# Patient Record
Sex: MALE | Race: WHITE | NOT HISPANIC OR LATINO | Employment: UNEMPLOYED | URBAN - NONMETROPOLITAN AREA
[De-identification: names, ages, dates, MRNs, and addresses within clinical notes are randomized per-mention and may not be internally consistent; named-entity substitution may affect disease eponyms.]

---

## 2020-10-18 ENCOUNTER — APPOINTMENT (OUTPATIENT)
Dept: CT IMAGING | Facility: HOSPITAL | Age: 45
End: 2020-10-18

## 2020-10-18 ENCOUNTER — APPOINTMENT (OUTPATIENT)
Dept: GENERAL RADIOLOGY | Facility: HOSPITAL | Age: 45
End: 2020-10-18

## 2020-10-18 ENCOUNTER — HOSPITAL ENCOUNTER (INPATIENT)
Facility: HOSPITAL | Age: 45
LOS: 2 days | Discharge: LEFT BEFORE DISCHARGE | End: 2020-10-20
Attending: EMERGENCY MEDICINE | Admitting: INTERNAL MEDICINE

## 2020-10-18 DIAGNOSIS — R55 SYNCOPE, CARDIOGENIC: ICD-10-CM

## 2020-10-18 DIAGNOSIS — R00.1 SYMPTOMATIC BRADYCARDIA: Primary | ICD-10-CM

## 2020-10-18 LAB
6-ACETYL MORPHINE: NEGATIVE
ALBUMIN SERPL-MCNC: 4.24 G/DL (ref 3.5–5.2)
ALBUMIN/GLOB SERPL: 1.3 G/DL
ALP SERPL-CCNC: 120 U/L (ref 39–117)
ALT SERPL W P-5'-P-CCNC: 216 U/L (ref 1–41)
AMPHET+METHAMPHET UR QL: NEGATIVE
ANION GAP SERPL CALCULATED.3IONS-SCNC: 10.8 MMOL/L (ref 5–15)
AST SERPL-CCNC: 141 U/L (ref 1–40)
BACTERIA UR QL AUTO: NORMAL /HPF
BARBITURATES UR QL SCN: NEGATIVE
BASOPHILS # BLD AUTO: 0.06 10*3/MM3 (ref 0–0.2)
BASOPHILS NFR BLD AUTO: 0.5 % (ref 0–1.5)
BENZODIAZ UR QL SCN: NEGATIVE
BILIRUB SERPL-MCNC: 0.4 MG/DL (ref 0–1.2)
BILIRUB UR QL STRIP: NEGATIVE
BUN SERPL-MCNC: 8 MG/DL (ref 6–20)
BUN/CREAT SERPL: 9.6 (ref 7–25)
BUPRENORPHINE SERPL-MCNC: POSITIVE NG/ML
CALCIUM SPEC-SCNC: 9.4 MG/DL (ref 8.6–10.5)
CANNABINOIDS SERPL QL: POSITIVE
CHLORIDE SERPL-SCNC: 102 MMOL/L (ref 98–107)
CLARITY UR: CLEAR
CO2 SERPL-SCNC: 23.2 MMOL/L (ref 22–29)
COCAINE UR QL: NEGATIVE
COLOR UR: ABNORMAL
CREAT SERPL-MCNC: 0.83 MG/DL (ref 0.76–1.27)
D DIMER PPP FEU-MCNC: 0.53 MCGFEU/ML (ref 0–0.5)
DEPRECATED RDW RBC AUTO: 44.7 FL (ref 37–54)
EOSINOPHIL # BLD AUTO: 0.25 10*3/MM3 (ref 0–0.4)
EOSINOPHIL NFR BLD AUTO: 2.2 % (ref 0.3–6.2)
ERYTHROCYTE [DISTWIDTH] IN BLOOD BY AUTOMATED COUNT: 13 % (ref 12.3–15.4)
ETHANOL BLD-MCNC: <10 MG/DL (ref 0–10)
ETHANOL UR QL: <0.01 %
GFR SERPL CREATININE-BSD FRML MDRD: 101 ML/MIN/1.73
GLOBULIN UR ELPH-MCNC: 3.4 GM/DL
GLUCOSE SERPL-MCNC: 94 MG/DL (ref 65–99)
GLUCOSE UR STRIP-MCNC: NEGATIVE MG/DL
HAV IGM SERPL QL IA: ABNORMAL
HBV CORE IGM SERPL QL IA: REACTIVE
HBV SURFACE AG SERPL QL IA: ABNORMAL
HCT VFR BLD AUTO: 47.5 % (ref 37.5–51)
HCV AB SER DONR QL: ABNORMAL
HGB BLD-MCNC: 15.8 G/DL (ref 13–17.7)
HGB UR QL STRIP.AUTO: NEGATIVE
HOLD SPECIMEN: NORMAL
HOLD SPECIMEN: NORMAL
HYALINE CASTS UR QL AUTO: NORMAL /LPF
IMM GRANULOCYTES # BLD AUTO: 0.03 10*3/MM3 (ref 0–0.05)
IMM GRANULOCYTES NFR BLD AUTO: 0.3 % (ref 0–0.5)
KETONES UR QL STRIP: NEGATIVE
LEUKOCYTE ESTERASE UR QL STRIP.AUTO: ABNORMAL
LIPASE SERPL-CCNC: 47 U/L (ref 13–60)
LYMPHOCYTES # BLD AUTO: 1.84 10*3/MM3 (ref 0.7–3.1)
LYMPHOCYTES NFR BLD AUTO: 16.3 % (ref 19.6–45.3)
MAGNESIUM SERPL-MCNC: 2 MG/DL (ref 1.6–2.6)
MCH RBC QN AUTO: 30.9 PG (ref 26.6–33)
MCHC RBC AUTO-ENTMCNC: 33.3 G/DL (ref 31.5–35.7)
MCV RBC AUTO: 92.8 FL (ref 79–97)
METHADONE UR QL SCN: NEGATIVE
MONOCYTES # BLD AUTO: 1.08 10*3/MM3 (ref 0.1–0.9)
MONOCYTES NFR BLD AUTO: 9.6 % (ref 5–12)
NEUTROPHILS NFR BLD AUTO: 71.1 % (ref 42.7–76)
NEUTROPHILS NFR BLD AUTO: 8 10*3/MM3 (ref 1.7–7)
NITRITE UR QL STRIP: NEGATIVE
NRBC BLD AUTO-RTO: 0 /100 WBC (ref 0–0.2)
OPIATES UR QL: NEGATIVE
OXYCODONE UR QL SCN: NEGATIVE
PCP UR QL SCN: NEGATIVE
PH UR STRIP.AUTO: 6 [PH] (ref 5–8)
PLATELET # BLD AUTO: 231 10*3/MM3 (ref 140–450)
PMV BLD AUTO: 10.1 FL (ref 6–12)
POTASSIUM SERPL-SCNC: 3.9 MMOL/L (ref 3.5–5.2)
PROT SERPL-MCNC: 7.6 G/DL (ref 6–8.5)
PROT UR QL STRIP: NEGATIVE
RBC # BLD AUTO: 5.12 10*6/MM3 (ref 4.14–5.8)
RBC # UR: NORMAL /HPF
REF LAB TEST METHOD: NORMAL
SARS-COV-2 RNA RESP QL NAA+PROBE: NOT DETECTED
SODIUM SERPL-SCNC: 136 MMOL/L (ref 136–145)
SP GR UR STRIP: 1.02 (ref 1–1.03)
SQUAMOUS #/AREA URNS HPF: NORMAL /HPF
T4 FREE SERPL-MCNC: 1.05 NG/DL (ref 0.93–1.7)
TROPONIN T SERPL-MCNC: <0.01 NG/ML (ref 0–0.03)
TSH SERPL DL<=0.05 MIU/L-ACNC: 6.04 UIU/ML (ref 0.27–4.2)
UROBILINOGEN UR QL STRIP: ABNORMAL
WBC # BLD AUTO: 11.26 10*3/MM3 (ref 3.4–10.8)
WBC UR QL AUTO: NORMAL /HPF
WHOLE BLOOD HOLD SPECIMEN: NORMAL
WHOLE BLOOD HOLD SPECIMEN: NORMAL

## 2020-10-18 PROCEDURE — 0 IOPAMIDOL PER 1 ML: Performed by: EMERGENCY MEDICINE

## 2020-10-18 PROCEDURE — 80307 DRUG TEST PRSMV CHEM ANLYZR: CPT | Performed by: EMERGENCY MEDICINE

## 2020-10-18 PROCEDURE — 85379 FIBRIN DEGRADATION QUANT: CPT | Performed by: EMERGENCY MEDICINE

## 2020-10-18 PROCEDURE — 84484 ASSAY OF TROPONIN QUANT: CPT | Performed by: EMERGENCY MEDICINE

## 2020-10-18 PROCEDURE — 99285 EMERGENCY DEPT VISIT HI MDM: CPT

## 2020-10-18 PROCEDURE — 99222 1ST HOSP IP/OBS MODERATE 55: CPT | Performed by: INTERNAL MEDICINE

## 2020-10-18 PROCEDURE — 93010 ELECTROCARDIOGRAM REPORT: CPT | Performed by: SPECIALIST

## 2020-10-18 PROCEDURE — 83735 ASSAY OF MAGNESIUM: CPT | Performed by: EMERGENCY MEDICINE

## 2020-10-18 PROCEDURE — 93005 ELECTROCARDIOGRAM TRACING: CPT | Performed by: STUDENT IN AN ORGANIZED HEALTH CARE EDUCATION/TRAINING PROGRAM

## 2020-10-18 PROCEDURE — 80074 ACUTE HEPATITIS PANEL: CPT | Performed by: INTERNAL MEDICINE

## 2020-10-18 PROCEDURE — 84484 ASSAY OF TROPONIN QUANT: CPT | Performed by: INTERNAL MEDICINE

## 2020-10-18 PROCEDURE — 25010000002 ENOXAPARIN PER 10 MG: Performed by: INTERNAL MEDICINE

## 2020-10-18 PROCEDURE — 84443 ASSAY THYROID STIM HORMONE: CPT | Performed by: EMERGENCY MEDICINE

## 2020-10-18 PROCEDURE — 93005 ELECTROCARDIOGRAM TRACING: CPT | Performed by: EMERGENCY MEDICINE

## 2020-10-18 PROCEDURE — 70450 CT HEAD/BRAIN W/O DYE: CPT

## 2020-10-18 PROCEDURE — 71275 CT ANGIOGRAPHY CHEST: CPT

## 2020-10-18 PROCEDURE — 71045 X-RAY EXAM CHEST 1 VIEW: CPT | Performed by: RADIOLOGY

## 2020-10-18 PROCEDURE — 93010 ELECTROCARDIOGRAM REPORT: CPT | Performed by: INTERNAL MEDICINE

## 2020-10-18 PROCEDURE — 85025 COMPLETE CBC W/AUTO DIFF WBC: CPT | Performed by: EMERGENCY MEDICINE

## 2020-10-18 PROCEDURE — 87635 SARS-COV-2 COVID-19 AMP PRB: CPT | Performed by: EMERGENCY MEDICINE

## 2020-10-18 PROCEDURE — 81001 URINALYSIS AUTO W/SCOPE: CPT | Performed by: EMERGENCY MEDICINE

## 2020-10-18 PROCEDURE — 36415 COLL VENOUS BLD VENIPUNCTURE: CPT

## 2020-10-18 PROCEDURE — 71045 X-RAY EXAM CHEST 1 VIEW: CPT

## 2020-10-18 PROCEDURE — 87341 HEP B SURFACE AG NEUTRLZJ IA: CPT | Performed by: EMERGENCY MEDICINE

## 2020-10-18 PROCEDURE — 80053 COMPREHEN METABOLIC PANEL: CPT | Performed by: EMERGENCY MEDICINE

## 2020-10-18 PROCEDURE — 84439 ASSAY OF FREE THYROXINE: CPT | Performed by: EMERGENCY MEDICINE

## 2020-10-18 PROCEDURE — 83690 ASSAY OF LIPASE: CPT | Performed by: EMERGENCY MEDICINE

## 2020-10-18 RX ORDER — BUPRENORPHINE HYDROCHLORIDE AND NALOXONE HYDROCHLORIDE DIHYDRATE 8; 2 MG/1; MG/1
1 TABLET SUBLINGUAL DAILY
Status: DISCONTINUED | OUTPATIENT
Start: 2020-10-19 | End: 2020-10-19

## 2020-10-18 RX ORDER — SODIUM CHLORIDE 9 MG/ML
125 INJECTION, SOLUTION INTRAVENOUS CONTINUOUS
Status: DISCONTINUED | OUTPATIENT
Start: 2020-10-18 | End: 2020-10-19

## 2020-10-18 RX ORDER — SODIUM CHLORIDE 0.9 % (FLUSH) 0.9 %
10 SYRINGE (ML) INJECTION AS NEEDED
Status: DISCONTINUED | OUTPATIENT
Start: 2020-10-18 | End: 2020-10-21 | Stop reason: HOSPADM

## 2020-10-18 RX ORDER — SODIUM CHLORIDE 9 MG/ML
100 INJECTION, SOLUTION INTRAVENOUS CONTINUOUS
Status: DISCONTINUED | OUTPATIENT
Start: 2020-10-18 | End: 2020-10-18

## 2020-10-18 RX ORDER — BUPRENORPHINE HYDROCHLORIDE AND NALOXONE HYDROCHLORIDE DIHYDRATE 8; 2 MG/1; MG/1
1 TABLET SUBLINGUAL DAILY
Status: DISCONTINUED | OUTPATIENT
Start: 2020-10-19 | End: 2020-10-18

## 2020-10-18 RX ORDER — BUPRENORPHINE HYDROCHLORIDE AND NALOXONE HYDROCHLORIDE DIHYDRATE 8; 2 MG/1; MG/1
1 TABLET SUBLINGUAL ONCE
Status: COMPLETED | OUTPATIENT
Start: 2020-10-18 | End: 2020-10-18

## 2020-10-18 RX ORDER — BUPRENORPHINE HYDROCHLORIDE AND NALOXONE HYDROCHLORIDE DIHYDRATE 8; 2 MG/1; MG/1
1 TABLET SUBLINGUAL DAILY
COMMUNITY

## 2020-10-18 RX ORDER — SODIUM CHLORIDE 0.9 % (FLUSH) 0.9 %
10 SYRINGE (ML) INJECTION EVERY 12 HOURS SCHEDULED
Status: DISCONTINUED | OUTPATIENT
Start: 2020-10-18 | End: 2020-10-21 | Stop reason: HOSPADM

## 2020-10-18 RX ADMIN — SODIUM CHLORIDE 1000 ML: 9 INJECTION, SOLUTION INTRAVENOUS at 14:15

## 2020-10-18 RX ADMIN — ENOXAPARIN SODIUM 40 MG: 40 INJECTION SUBCUTANEOUS at 22:35

## 2020-10-18 RX ADMIN — SODIUM CHLORIDE, PRESERVATIVE FREE 10 ML: 5 INJECTION INTRAVENOUS at 20:15

## 2020-10-18 RX ADMIN — BUPRENORPHINE HYDROCHLORIDE AND NALOXONE HYDROCHLORIDE DIHYDRATE 1 TABLET: 8; 2 TABLET SUBLINGUAL at 22:36

## 2020-10-18 RX ADMIN — IOPAMIDOL 84 ML: 755 INJECTION, SOLUTION INTRAVENOUS at 16:53

## 2020-10-18 RX ADMIN — SODIUM CHLORIDE 125 ML/HR: 9 INJECTION, SOLUTION INTRAVENOUS at 15:04
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
## 2020-10-18 NOTE — ED NOTES
Pt placed on 2lpm nc per verbal order from dr olivarez. [email or web address].     April Betancourt, RN  10/18/20 2762

## 2020-10-18 NOTE — ED NOTES
Gave report to rubin cota on ccu. Nurse received report, reports room has not been cleaned yet and she will call us back when it is ready.      April Betancourt RN  10/18/20 2470

## 2020-10-18 NOTE — ED NOTES
Pt diaphoretic, complains of sob, hr 45. Dr olivarez made aware, will await new orders.     April Betancourt, RN  10/18/20 4984
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
## 2020-10-18 NOTE — ED NOTES
Obtained and verified informed consent for ct with iv contrast per pe protocol. Pt signed form.     April Betancourt RN  10/18/20 8388

## 2020-10-18 NOTE — ED NOTES
Dr renteria at bedside, pt became diaphoretic, noted pt rhythm to be afib and then asystole, pt loss consciousness. Dr olivarez to bedside.       April Betancourt, MARS  10/18/20 1802       April Betancourt, MARS  10/18/20 1804

## 2020-10-18 NOTE — ED NOTES
Pt alert and oriented, skin pink, warm, and diaphoretic, respirations even and unlabored, pt denies pain. Pt is not diaphoretic and reports he feels a little better. Pt remains on 2lpm nc. Sinus arrhythmia noted. Hr 86. Family at bedside. Pt remains on defib pads and zoles monitoring. Crash cart remains at bedside.     April Betancourt, MARS  10/18/20 1819       April Betancourt, MARS  10/18/20 1912

## 2020-10-18 NOTE — ED NOTES
Pt now noted to be in afib, provider made aware, pt resting on stretcher, pt is alert and oriented, skin pale, warm, and diaphoretic. respirations even and unlabored. Pt remains on 2lpm nc. Wctm. Family at bedside.     April Betancourt, RN  10/18/20 1835       April Betancourt, MARS  10/18/20 1912
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
## 2020-10-18 NOTE — PROGRESS NOTES
Discharge Planning Assessment   [place]     Patient Name: Neo Chung  MRN: [id number]  Today's Date: 10/18/2020    Admit Date: 10/18/2020    Discharge Needs Assessment     Row Name 10/18/20 1820       Living Environment    Lives With  significant other    Name(s) of Who Lives With Patient  lives with significant other Jenn Sparks    Current Living Arrangements  home/apartment/condo    Primary Care Provided by  self    Quality of Family Relationships  helpful;involved;supportive    Able to Return to Prior Arrangements  yes       Resource/Environmental Concerns    Resource/Environmental Concerns  none       Transition Planning    Patient/Family Anticipates Transition to  home with family    Patient/Family Anticipated Services at Transition  none    Transportation Anticipated  car, drives self;family or friend will provide       Discharge Needs Assessment    Readmission Within the Last 30 Days  no previous admission in last 30 days    Equipment Currently Used at Home  none    Concerns to be Addressed  no discharge needs identified;denies needs/concerns at this time    Anticipated Changes Related to Illness  none    Equipment Needed After Discharge  none        Discharge Plan     Row Name 10/18/20 1821       Plan    Plan  Pt lives with significant other Jenn Sparks who is at bedside and plans to return home upon discharge, pt states he provides his own transportation. Pt does not have a pcp, he uses [facility] in [place] and has [employer] insurance. Pt denies any issues with meds or copays. Pt does not have a poa or advanced directives. Pt is independent with adl's and does not use any dme's, home health or home o2. No needs notifed at this time.    Patient/Family in Agreement with Plan  yes        Continued Care and Services - Admitted Since 10/18/2020    Coordination has not been started for this encounter.         Demographic Summary     Row Name 10/18/20 1820       General Information     Admission Type  inpatient    Arrived From  home    Referral Source  emergency department    Reason for Consult  discharge planning    Preferred Language  English        Functional Status     Row Name 10/18/20 1820       Functional Status    Usual Activity Tolerance  good    Current Activity Tolerance  fair       Mental Status    General Appearance WDL  WDL        Psychosocial     Row Name 10/18/20 1820       Behavior WDL    Behavior WDL  WDL       Emotion Mood WDL    Emotion/Mood/Affect WDL  WDL       Speech WDL    Speech WDL  WDL        Abuse/Neglect    No documentation.       Legal    No documentation.       Substance Abuse    No documentation.       Patient Forms    No documentation.           Apple Acevedo RN

## 2020-10-18 NOTE — ED NOTES
Unit secretary reports nurse is unavailable to take report at this time.     April Betancourt, RN  10/18/20 3661

## 2020-10-18 NOTE — ED NOTES
pt converted to sinus arrhythmia, dr olivarez to bedside. defib pads and zoles monitor placed on pt. Crash cart at bedside, dr olivarez now at bedside.     April Betancourt RN  10/18/20 1479

## 2020-10-19 ENCOUNTER — APPOINTMENT (OUTPATIENT)
Dept: CARDIOLOGY | Facility: HOSPITAL | Age: 45
End: 2020-10-19

## 2020-10-19 LAB
ALBUMIN SERPL-MCNC: 3.59 G/DL (ref 3.5–5.2)
ALBUMIN/GLOB SERPL: 1.2 G/DL
ALP SERPL-CCNC: 100 U/L (ref 39–117)
ALT SERPL W P-5'-P-CCNC: 185 U/L (ref 1–41)
ANION GAP SERPL CALCULATED.3IONS-SCNC: 8.5 MMOL/L (ref 5–15)
AST SERPL-CCNC: 112 U/L (ref 1–40)
BASOPHILS # BLD AUTO: 0.03 10*3/MM3 (ref 0–0.2)
BASOPHILS NFR BLD AUTO: 0.3 % (ref 0–1.5)
BH CV ECHO MEAS - % IVS THICK: -4 %
BH CV ECHO MEAS - % LVPW THICK: 7 %
BH CV ECHO MEAS - ACS: 1.8 CM
BH CV ECHO MEAS - AO MAX PG: 11.4 MMHG
BH CV ECHO MEAS - AO MEAN PG: 4 MMHG
BH CV ECHO MEAS - AO ROOT AREA (BSA CORRECTED): 1.9
BH CV ECHO MEAS - AO ROOT AREA: 9.6 CM^2
BH CV ECHO MEAS - AO ROOT DIAM: 3.5 CM
BH CV ECHO MEAS - AO V2 MAX: 169 CM/SEC
BH CV ECHO MEAS - AO V2 MEAN: 92.4 CM/SEC
BH CV ECHO MEAS - AO V2 VTI: 30.4 CM
BH CV ECHO MEAS - BSA(HAYCOCK): 1.9 M^2
BH CV ECHO MEAS - BSA: 1.9 M^2
BH CV ECHO MEAS - BZI_BMI: 23.3 KILOGRAMS/M^2
BH CV ECHO MEAS - BZI_METRIC_HEIGHT: 175.3 CM
BH CV ECHO MEAS - BZI_METRIC_WEIGHT: 71.7 KG
BH CV ECHO MEAS - EDV(CUBED): 111.6 ML
BH CV ECHO MEAS - EDV(MOD-SP4): 86.7 ML
BH CV ECHO MEAS - EDV(TEICH): 108.3 ML
BH CV ECHO MEAS - EF(CUBED): 75.7 %
BH CV ECHO MEAS - EF(MOD-SP4): 66.9 %
BH CV ECHO MEAS - EF(TEICH): 67.6 %
BH CV ECHO MEAS - ESV(CUBED): 27.1 ML
BH CV ECHO MEAS - ESV(MOD-SP4): 28.7 ML
BH CV ECHO MEAS - ESV(TEICH): 35.1 ML
BH CV ECHO MEAS - FS: 37.6 %
BH CV ECHO MEAS - IVS/LVPW: 1
BH CV ECHO MEAS - IVSD: 0.97 CM
BH CV ECHO MEAS - IVSS: 0.93 CM
BH CV ECHO MEAS - LA DIMENSION: 2.8 CM
BH CV ECHO MEAS - LA/AO: 0.79
BH CV ECHO MEAS - LV DIASTOLIC VOL/BSA (35-75): 46.4 ML/M^2
BH CV ECHO MEAS - LV MASS(C)D: 160.2 GRAMS
BH CV ECHO MEAS - LV MASS(C)DI: 85.7 GRAMS/M^2
BH CV ECHO MEAS - LV MASS(C)S: 78.1 GRAMS
BH CV ECHO MEAS - LV MASS(C)SI: 41.8 GRAMS/M^2
BH CV ECHO MEAS - LV SYSTOLIC VOL/BSA (12-30): 15.4 ML/M^2
BH CV ECHO MEAS - LVIDD: 4.8 CM
BH CV ECHO MEAS - LVIDS: 3 CM
BH CV ECHO MEAS - LVLD AP4: 7.7 CM
BH CV ECHO MEAS - LVLS AP4: 5.8 CM
BH CV ECHO MEAS - LVOT AREA (M): 3.5 CM^2
BH CV ECHO MEAS - LVOT AREA: 3.5 CM^2
BH CV ECHO MEAS - LVOT DIAM: 2.1 CM
BH CV ECHO MEAS - LVPWD: 0.94 CM
BH CV ECHO MEAS - LVPWS: 1 CM
BH CV ECHO MEAS - MV A MAX VEL: 102 CM/SEC
BH CV ECHO MEAS - MV E MAX VEL: 115 CM/SEC
BH CV ECHO MEAS - MV E/A: 1.1
BH CV ECHO MEAS - PA ACC TIME: 0.1 SEC
BH CV ECHO MEAS - PA PR(ACCEL): 33.1 MMHG
BH CV ECHO MEAS - SI(AO): 156.5 ML/M^2
BH CV ECHO MEAS - SI(CUBED): 45.2 ML/M^2
BH CV ECHO MEAS - SI(MOD-SP4): 31 ML/M^2
BH CV ECHO MEAS - SI(TEICH): 39.2 ML/M^2
BH CV ECHO MEAS - SV(AO): 292.5 ML
BH CV ECHO MEAS - SV(CUBED): 84.5 ML
BH CV ECHO MEAS - SV(MOD-SP4): 58 ML
BH CV ECHO MEAS - SV(TEICH): 73.2 ML
BILIRUB SERPL-MCNC: 0.4 MG/DL (ref 0–1.2)
BUN SERPL-MCNC: 7 MG/DL (ref 6–20)
BUN/CREAT SERPL: 10.6 (ref 7–25)
CALCIUM SPEC-SCNC: 8.8 MG/DL (ref 8.6–10.5)
CHLORIDE SERPL-SCNC: 107 MMOL/L (ref 98–107)
CO2 SERPL-SCNC: 23.5 MMOL/L (ref 22–29)
CREAT SERPL-MCNC: 0.66 MG/DL (ref 0.76–1.27)
DEPRECATED RDW RBC AUTO: 44.7 FL (ref 37–54)
EOSINOPHIL # BLD AUTO: 0.11 10*3/MM3 (ref 0–0.4)
EOSINOPHIL NFR BLD AUTO: 1.1 % (ref 0.3–6.2)
ERYTHROCYTE [DISTWIDTH] IN BLOOD BY AUTOMATED COUNT: 13.1 % (ref 12.3–15.4)
GFR SERPL CREATININE-BSD FRML MDRD: 132 ML/MIN/1.73
GLOBULIN UR ELPH-MCNC: 2.9 GM/DL
GLUCOSE SERPL-MCNC: 107 MG/DL (ref 65–99)
HCT VFR BLD AUTO: 42.9 % (ref 37.5–51)
HGB BLD-MCNC: 14.4 G/DL (ref 13–17.7)
IMM GRANULOCYTES # BLD AUTO: 0.03 10*3/MM3 (ref 0–0.05)
IMM GRANULOCYTES NFR BLD AUTO: 0.3 % (ref 0–0.5)
LYMPHOCYTES # BLD AUTO: 1.79 10*3/MM3 (ref 0.7–3.1)
LYMPHOCYTES NFR BLD AUTO: 17.4 % (ref 19.6–45.3)
MAGNESIUM SERPL-MCNC: 2 MG/DL (ref 1.6–2.6)
MAXIMAL PREDICTED HEART RATE: 177 BPM
MCH RBC QN AUTO: 31.4 PG (ref 26.6–33)
MCHC RBC AUTO-ENTMCNC: 33.6 G/DL (ref 31.5–35.7)
MCV RBC AUTO: 93.5 FL (ref 79–97)
MONOCYTES # BLD AUTO: 0.99 10*3/MM3 (ref 0.1–0.9)
MONOCYTES NFR BLD AUTO: 9.6 % (ref 5–12)
NEUTROPHILS NFR BLD AUTO: 7.31 10*3/MM3 (ref 1.7–7)
NEUTROPHILS NFR BLD AUTO: 71.3 % (ref 42.7–76)
NRBC BLD AUTO-RTO: 0 /100 WBC (ref 0–0.2)
PLATELET # BLD AUTO: 209 10*3/MM3 (ref 140–450)
PMV BLD AUTO: 9.4 FL (ref 6–12)
POTASSIUM SERPL-SCNC: 4.2 MMOL/L (ref 3.5–5.2)
PROT SERPL-MCNC: 6.5 G/DL (ref 6–8.5)
RBC # BLD AUTO: 4.59 10*6/MM3 (ref 4.14–5.8)
SODIUM SERPL-SCNC: 139 MMOL/L (ref 136–145)
STRESS TARGET HR: 150 BPM
T4 FREE SERPL-MCNC: 0.94 NG/DL (ref 0.93–1.7)
TROPONIN T SERPL-MCNC: <0.01 NG/ML (ref 0–0.03)
TROPONIN T SERPL-MCNC: <0.01 NG/ML (ref 0–0.03)
TSH SERPL DL<=0.05 MIU/L-ACNC: 3.1 UIU/ML (ref 0.27–4.2)
WBC # BLD AUTO: 10.26 10*3/MM3 (ref 3.4–10.8)

## 2020-10-19 PROCEDURE — 80053 COMPREHEN METABOLIC PANEL: CPT | Performed by: INTERNAL MEDICINE

## 2020-10-19 PROCEDURE — 25010000002 ENOXAPARIN PER 10 MG: Performed by: INTERNAL MEDICINE

## 2020-10-19 PROCEDURE — 85025 COMPLETE CBC W/AUTO DIFF WBC: CPT | Performed by: INTERNAL MEDICINE

## 2020-10-19 PROCEDURE — 84484 ASSAY OF TROPONIN QUANT: CPT | Performed by: INTERNAL MEDICINE

## 2020-10-19 PROCEDURE — 84439 ASSAY OF FREE THYROXINE: CPT | Performed by: INTERNAL MEDICINE

## 2020-10-19 PROCEDURE — 99222 1ST HOSP IP/OBS MODERATE 55: CPT | Performed by: SPECIALIST

## 2020-10-19 PROCEDURE — 93306 TTE W/DOPPLER COMPLETE: CPT | Performed by: INTERNAL MEDICINE

## 2020-10-19 PROCEDURE — 99233 SBSQ HOSP IP/OBS HIGH 50: CPT | Performed by: INTERNAL MEDICINE

## 2020-10-19 PROCEDURE — 94799 UNLISTED PULMONARY SVC/PX: CPT

## 2020-10-19 PROCEDURE — 84443 ASSAY THYROID STIM HORMONE: CPT | Performed by: INTERNAL MEDICINE

## 2020-10-19 PROCEDURE — 93306 TTE W/DOPPLER COMPLETE: CPT

## 2020-10-19 PROCEDURE — 83735 ASSAY OF MAGNESIUM: CPT | Performed by: INTERNAL MEDICINE

## 2020-10-19 RX ORDER — BUPRENORPHINE HYDROCHLORIDE AND NALOXONE HYDROCHLORIDE DIHYDRATE 8; 2 MG/1; MG/1
0.5 TABLET SUBLINGUAL 2 TIMES DAILY
Status: DISCONTINUED | OUTPATIENT
Start: 2020-10-19 | End: 2020-10-21 | Stop reason: HOSPADM

## 2020-10-19 RX ADMIN — BUPRENORPHINE HYDROCHLORIDE AND NALOXONE HYDROCHLORIDE DIHYDRATE 0.5 TABLET: 8; 2 TABLET SUBLINGUAL at 08:34

## 2020-10-19 RX ADMIN — SODIUM CHLORIDE, PRESERVATIVE FREE 10 ML: 5 INJECTION INTRAVENOUS at 20:31

## 2020-10-19 RX ADMIN — BUPRENORPHINE HYDROCHLORIDE AND NALOXONE HYDROCHLORIDE DIHYDRATE 0.5 TABLET: 8; 2 TABLET SUBLINGUAL at 15:17

## 2020-10-19 RX ADMIN — SODIUM CHLORIDE, PRESERVATIVE FREE 10 ML: 5 INJECTION INTRAVENOUS at 08:35

## 2020-10-19 RX ADMIN — ENOXAPARIN SODIUM 40 MG: 40 INJECTION SUBCUTANEOUS at 21:44

## 2020-10-19 RX ADMIN — SODIUM CHLORIDE 125 ML/HR: 9 INJECTION, SOLUTION INTRAVENOUS at 04:13

## 2020-10-19 NOTE — PAYOR COMM NOTE
"  [facility]  NPI: [id number]    Utilization Review   Contact:Sasha Negron MSN, APRN, NP-C  Phone: [phone]  Fax: [phone]    humana mcaid/Attn: nurse review  Inpatient Auth Req  REF: [id number]    Neo Chung (44 y.o. Male)     Date of Birth Social Security Number Address Home Phone MRN    1975  [address] [facility] [address] [phone] [id number]    [employer] Marital Status          None Single       Admission Date Admission Type Admitting Provider Attending Provider Department, Room/Bed    10/18/20 Emergency Augustine Uriostegui MD Hacker, Bill J, MD [facility] CRITICAL CARE, 05/1C    Discharge Date Discharge Disposition Discharge Destination                       Attending Provider: Alfredo Rollins MD    Allergies: No Known Allergies    Isolation: None   Infection: None   Code Status: CPR    Ht: 175.3 cm (69\")   Wt: 72 kg (158 lb 11.7 oz)    Admission Cmt: None   Principal Problem: None                Active Insurance as of 10/18/2020     Primary Coverage     Payor Plan Insurance Group Employer/Plan Group    [place] [id number]     Payor Plan Address Payor Plan Phone Number Payor Plan Fax Number Effective Dates    [employer] Claims Office - [address] [address] [phone]  4/1/2020 - None Entered    [facility] [id number]       Subscriber Name Subscriber Birth Date Member ID       NEO CHUNG 1975 [id number]                 Emergency Contacts      (Rel.) Home Phone Work Phone Mobile Phone    BARNEY HICKEY (Significant Other) [phone] -- --               History & Physical      Augustine Uriostegui MD at 10/18/20 1830                      Date of admission: 10/18/2020     Assisted by: Patient's significant other, permission to talk in front of her obtained from the patient, also later multiple staff members when the patient became severely bradycardic     Chief complaint: Passed out     History of present illness: Patient " "is a 44-year-old male who really he states is never seen a doctor with exception of at the Suboxone clinic who was in his usual state of health until the first time this happened today he had an episode where he felt hot and then became sweaty, he was driving a car but only going 2 to 3 miles an hour, his significant other was able to get it up into neutral and then parked, she states he passed out but was only out for about 15 minutes.  He was alert and oriented when he came to.  He had another episode when he walked in from the parking lot to our emergency room of similar nature, he became diaphoretic and then passed out.  He had another episode in the emergency room and then when I was actually in the room patient felt like it was happening again, his face turned red, he got very diaphoretic and then had a syncopal episode, monitor correlated at that time with a long pause that appeared to be asystole for a brief time.  I had gone out briefly to asked them to get another EKG as he looked like he was having an abnormal rhythm such as atrial fibrillation.  When he woke up, he was alert and oriented, there were no seizure movements noted, he denied any chest pain, he states he has been more short winded today.  PE protocol done out of the emergency room showed no PE.  Patient does use regular marijuana states he last smoked this morning but he has done this since he was a teenager.  He also takes regular Suboxone twice daily but he is not change the dosing of this recently.  He states at home he occasionally will see \"some spots that are very brief\" and occasionally feel little \"lightheaded\" but overall no complaints he is active without chest pain.     Past medical history:  Chronic pain  Otherwise no known past medical history     No known medical allergies     Social history: Positive for smoking, positive marijuana, no other illicit drugs, he used to drink alcohol but has not done this in a long time.     Family " history: No premature coronary disease, father  of alcoholism, he states his mother is overall fairly healthy.     ROS: Negative except chronic pain and as per HPI.     Exam: When I initially saw him he was alert, skin warm and dry, answers questions appropriately, during this event he was diaphoretic flushed and unresponsive for a brief time.  When he did wake up he was oriented.  Hearing is intact conjunctiva unremarkable speech is normal face symmetric pupils equal no carotid bruits are heard neck is supple lungs have bilateral breath sounds are clear no rhonchi rales or wheezing heard, heart is an irregularly irregular rhythm without murmur rub or gallop, abdomen is soft benign bowel sounds active nondistended nontender, extremities with edema adequate distal pulses strength is symmetric neurologically nonfocal, mood is good        Data:  EKG obtained during the episode I witnessed by the time the EKG was hooked up it showed atrial fibrillation with a rate in the 70s image reviewed, the first 2 EKGs I have also reviewed and showed just sinus rhythm.     CT PE protocol showed small noncalcified nodules right upper lobe largest 5 mm, would recommend follow-up in 12 months.  He also was noted to have mild coronary vascular calcification     CT head negative     Chest x-ray negative     Laboratory data: UA small amount of leukocyte Estrace otherwise negative, toxicology screen was positive for what he stated he took which was Suboxone and THC, microscopic of the urine unremarkable, transaminases elevated otherwise chemistries unremarkable, magnesium normal lipase 47 D-dimer minimally elevated 0.53, troponin negative, TSH mildly elevated at 6 free T4 was normal ethanol level was negative white count 11 hemoglobin 15 hematocrit 47, platelet count 231     Assessment and plan:  Symptomatic bradycardia with associated syncope, this was witnessed on the monitor, he had a very long pause that almost appeared to be  asystole for a brief time.  This was brief and he came back with an adequate rate but was atrial fibrillation.  The exact etiology of this bradycardia is uncertain, both THC and Suboxone certainly have been known to cause bradycardia but he did not take any new dose.  His TSH is 6 which I am going to repeat but this would not be enough to cause this as well.  Electrolytes are unremarkable.  External pacemaker has been placed, this is to be turned on if he gets a heart rate below 50, he is being admitted to the CCU, briefly discussed this with cardiology, if the patient become symptomatic again would consider dopamine infusion.  Echocardiogram will be checked, will track serial troponins.  Repeat thyroid in the morning.  Patient because his significant other could not stay with him stated initially he was going to leave.  I did explain that he would most like die if he left, he has agreed to stay.  His [id number] score is 0 so I have not fully anticoagulated him considering he also had a negative PE protocol.     Elevated transaminases, possibly underlying hepatitis C, asymptomatic, I have ordered hepatitis profile.     DVT prophylaxis, subcu Lovenox     Small 5 mm largest pulmonary nodule, would recommend repeat CT in approximately 1 year.               Electronically signed by Augustine Uriostegui MD at 10/18/20 1831          Emergency Department Notes      April Betancourt RN at 10/18/20 1545        Obtained and verified informed consent for ct with iv contrast per pe protocol. Pt signed form.     April Betancourt RN  10/18/20 1615      Electronically signed by April Betancourt RN at 10/18/20 1615     April Betancourt RN at 10/18/20 1708        Pt diaphoretic, complains of sob, hr 45. Dr olivarez made aware, will await new orders.     April Betancourt RN  10/18/20 1709      Electronically signed by April Betancourt RN at 10/18/20 1709     April Betancourt RN at 10/18/20  1714        Pt placed on 2lpm nc per verbal order from dr olivarez. [email or web address].     April Betancourt, RN  10/18/20 1714      Electronically signed by April Betancourt RN at 10/18/20 1714     Kerry Hicks P at 10/18/20 1735        Paged hospitalist for Kerry Sarmiento P  10/18/20 1735      Electronically signed by Kerry Hicks at 10/18/20 1735     Kerry Hicks P at 10/18/20 1742        Dr olivarez on the phone with Dr Renteria at this time     Kerry Hicks P  10/18/20 1742      Electronically signed by Kerry Hicks at 10/18/20 1742     April Betancourt, MARS at 10/18/20 1756        Dr renteria at bedside, pt became diaphoretic, noted pt rhythm to be afib and then asystole, pt loss consciousness. Dr olivarez to bedside.       April Betancourt RN  10/18/20 1802       April Betancourt RN  10/18/20 1804      Electronically signed by April Betancourt RN at 10/18/20 1804     April Betancourt, RN at 10/18/20 1757        pt converted to sinus arrhythmia, dr olivarez to bedside. defib pads and zoles monitor placed on pt. Crash cart at bedside, dr olivarez now at bedside.     April Betancourt RN  10/18/20 1804      Electronically signed by April Betancourt RN at 10/18/20 1804     April Betancourt RN at 10/18/20 1819        Pt alert and oriented, skin pink, warm, and diaphoretic, respirations even and unlabored, pt denies pain. Pt is not diaphoretic and reports he feels a little better. Pt remains on 2lpm nc. Sinus arrhythmia noted. Hr 86. Family at bedside. Pt remains on defib pads and zoles monitoring. Crash cart remains at bedside.     April Betancourt RN  10/18/20 1819       April Betancourt RN  10/18/20 1912      Electronically signed by April Betancourt RN at 10/18/20 1912     April Betancourt RN at 10/18/20 1835        Pt now noted to be in afib, provider made aware, pt resting on stretcher, pt is alert and  oriented, skin pale, warm, and diaphoretic. respirations even and unlabored. Pt remains on 2lpm nc. Wctm. Family at bedside.     April Betancourt RN  10/18/20 1835       April Betancourt RN  10/18/20 1912      Electronically signed by April Betancourt RN at 10/18/20 1912     April Betancourt RN at 10/18/20 1853        Unit secretary reports nurse is unavailable to take report at this time.     April Betancourt RN  10/18/20 1854      Electronically signed by April Betancourt RN at 10/18/20 1854     April Betancourt RN at 10/18/20 1900        Gave report to rubin cota on ccu. Nurse received report, reports room has not been cleaned yet and she will call us back when it is ready.      April Betancourt RN  10/18/20 1901      Electronically signed by April Betancourt RN at 10/18/20 1901         Lab Results (last 72 hours)     Procedure Component Value Units Date/Time    Troponin [[id number]]  (Normal) Collected: 10/19/20 0209    Specimen: Blood Updated: 10/19/20 0255     Troponin T <0.010 ng/mL     Narrative:      Troponin T Reference Range:  <= 0.03 ng/mL-   Negative for AMI  >0.03 ng/mL-     Abnormal for myocardial necrosis.  Clinicians would have to utilize clinical acumen, EKG, Troponin and serial changes to determine if it is an Acute Myocardial Infarction or myocardial injury due to an underlying chronic condition.       Results may be falsely decreased if patient taking Biotin.      TSH [[id number]]  (Normal) Collected: 10/19/20 0209    Specimen: Blood Updated: 10/19/20 0255     TSH 3.100 uIU/mL     T4, Free [[id number]]  (Normal) Collected: 10/19/20 0209    Specimen: Blood Updated: 10/19/20 0255     Free T4 0.94 ng/dL     Narrative:      Results may be falsely increased if patient taking Biotin.      Comprehensive Metabolic Panel [[id number]]  (Abnormal) Collected: 10/19/20 0209    Specimen: Blood Updated: 10/19/20 0251     Glucose 107 mg/dL      BUN 7  mg/dL      Creatinine 0.66 mg/dL      Sodium 139 mmol/L      Potassium 4.2 mmol/L      Chloride 107 mmol/L      CO2 23.5 mmol/L      Calcium 8.8 mg/dL      Total Protein 6.5 g/dL      Albumin 3.59 g/dL      ALT (SGPT) 185 U/L      AST (SGOT) 112 U/L      Alkaline Phosphatase 100 U/L      Total Bilirubin 0.4 mg/dL      eGFR Non African Amer 132 mL/min/1.73      Globulin 2.9 gm/dL      A/G Ratio 1.2 g/dL      BUN/Creatinine Ratio 10.6     Anion Gap 8.5 mmol/L     Narrative:      GFR Normal >60  Chronic Kidney Disease <60  Kidney Failure <15      Magnesium [[id number]]  (Normal) Collected: 10/19/20 0209    Specimen: Blood Updated: 10/19/20 0251     Magnesium 2.0 mg/dL     CBC & Differential [[id number]]  (Abnormal) Collected: 10/19/20 0209    Specimen: Blood Updated: 10/19/20 0220    Narrative:      The following orders were created for panel order CBC & Differential.  Procedure                               Abnormality         Status                     ---------                               -----------         ------                     CBC Auto Differential[[id number]]        Abnormal            Final result                 Please view results for these tests on the individual orders.    CBC Auto Differential [[id number]]  (Abnormal) Collected: 10/19/20 0209    Specimen: Blood Updated: 10/19/20 0220     WBC 10.26 10*3/mm3      RBC 4.59 10*6/mm3      Hemoglobin 14.4 g/dL      Hematocrit 42.9 %      MCV 93.5 fL      MCH 31.4 pg      MCHC 33.6 g/dL      RDW 13.1 %      RDW-SD 44.7 fl      MPV 9.4 fL      Platelets 209 10*3/mm3      Neutrophil % 71.3 %      Lymphocyte % 17.4 %      Monocyte % 9.6 %      Eosinophil % 1.1 %      Basophil % 0.3 %      Immature Grans % 0.3 %      Neutrophils, Absolute 7.31 10*3/mm3      Lymphocytes, Absolute 1.79 10*3/mm3      Monocytes, Absolute 0.99 10*3/mm3      Eosinophils, Absolute 0.11 10*3/mm3      Basophils, Absolute 0.03 10*3/mm3      Immature Grans, Absolute 0.03 10*3/mm3      nRBC  0.0 /100 WBC     Hepatitis Panel, Acute [[id number]]  (Abnormal) Collected: 10/18/20 1411    Specimen: Blood Updated: 10/18/20 2116     Hepatitis B Surface Ag --     Hep A IgM Non-Reactive     Hep B C IgM Reactive     Hepatitis C Ab Non-Reactive    Narrative:      Results may be falsely decreased if patient taking Biotin.   Preliminary reactive result pending confirmation at [facility].     Hepatitis B Surface Antigen [[id number]] Collected: 10/18/20 1411    Specimen: Blood Updated: 10/18/20 2115    Troponin [[id number]]  (Normal) Collected: 10/18/20 2043    Specimen: Blood Updated: 10/18/20 2107     Troponin T <0.010 ng/mL     Narrative:      Troponin T Reference Range:  <= 0.03 ng/mL-   Negative for AMI  >0.03 ng/mL-     Abnormal for myocardial necrosis.  Clinicians would have to utilize clinical acumen, EKG, Troponin and serial changes to determine if it is an Acute Myocardial Infarction or myocardial injury due to an underlying chronic condition.       Results may be falsely decreased if patient taking Biotin.      Troponin [[id number]]  (Normal) Collected: 10/18/20 1630    Specimen: Blood Updated: 10/18/20 1659     Troponin T <0.010 ng/mL     Narrative:      Troponin T Reference Range:  <= 0.03 ng/mL-   Negative for AMI  >0.03 ng/mL-     Abnormal for myocardial necrosis.  Clinicians would have to utilize clinical acumen, EKG, Troponin and serial changes to determine if it is an Acute Myocardial Infarction or myocardial injury due to an underlying chronic condition.       Results may be falsely decreased if patient taking Biotin.      COVID PRE-OP / PRE-PROCEDURE SCREENING ORDER (NO ISOLATION) - Swab, Nasopharynx [[id number]]  (Normal) Collected: 10/18/20 1411    Specimen: Swab from Nasopharynx Updated: 10/18/20 1621    Narrative:      The following orders were created for panel order COVID PRE-OP / PRE-PROCEDURE SCREENING ORDER (NO ISOLATION) - Swab, Nasopharynx.  Procedure                               Abnormality          Status                     ---------                               -----------         ------                     COVID-19,CEPHEID,COR/JOCELIN...[[id number]]  Normal              Final result                 Please view results for these tests on the individual orders.    COVID-19,CEPHEID,COR/JOCELIN/PAD IN-HOUSE(OR EMERGENT/ADD-ON),NP SWAB IN TRANSPORT MEDIA 3-4 HR TAT - Swab, Nasopharynx [[id number]]  (Normal) Collected: 10/18/20 1411    Specimen: Swab from Nasopharynx Updated: 10/18/20 1621     COVID19 Not Detected    Narrative:      Fact sheet for providers: https://www.fda.gov/media/231721/download     Fact sheet for patients: https://www.fda.gov/media/331769/download    Urine Drug Screen - Urine, Clean Catch [[id number]]  (Abnormal) Collected: 10/18/20 1519    Specimen: Urine, Clean Catch Updated: 10/18/20 1618     Amphetamine Screen, Urine Negative     Barbiturates Screen, Urine Negative     Benzodiazepine Screen, Urine Negative     Cocaine Screen, Urine Negative     Methadone Screen, Urine Negative     Opiate Screen Negative     Phencyclidine (PCP), Urine Negative     THC, Screen, Urine Positive     6-ACETYL MORPHINE Negative     Buprenorphine, Screen, Urine Positive     Oxycodone Screen, Urine Negative    Narrative:      Negative Thresholds For Drugs Screened:                  Amphetamines              1000 ng/ml               Barbiturates               200 ng/ml               Benzodiazepines            200 ng/ml              Cocaine                    300 ng/ml              Methadone                  300 ng/ml              Opiates                    300 ng/ml               Phencyclidine               25 ng/ml               THC                         50 ng/ml              6-Acetyl Morphine           10 ng/ml              Buprenorphine                5 ng/ml              Oxycodone                  300 ng/ml    The reference range for all drugs tested is negative. This report includes final unconfirmed qualitative  results to be used for medical treatment purposes only. Unconfirmed results must not be used for non-medical purposes such as employment or legal testing. Clinical consideration should be applied to any drug of abuse test, especially when unconfirmed quantitative results are used.        Troponin [[id number]]  (Normal) Collected: 10/18/20 1411    Specimen: Blood Updated: 10/18/20 1549     Troponin T <0.010 ng/mL     Narrative:      Troponin T Reference Range:  <= 0.03 ng/mL-   Negative for AMI  >0.03 ng/mL-     Abnormal for myocardial necrosis.  Clinicians would have to utilize clinical acumen, EKG, Troponin and serial changes to determine if it is an Acute Myocardial Infarction or myocardial injury due to an underlying chronic condition.       Results may be falsely decreased if patient taking Biotin.      TSH [[id number]]  (Abnormal) Collected: 10/18/20 1411    Specimen: Blood Updated: 10/18/20 1549     TSH 6.040 uIU/mL     T4, Free [[id number]]  (Normal) Collected: 10/18/20 1411    Specimen: Blood Updated: 10/18/20 1549     Free T4 1.05 ng/dL     Narrative:      Results may be falsely increased if patient taking Biotin.      Comprehensive Metabolic Panel [[id number]]  (Abnormal) Collected: 10/18/20 1411    Specimen: Blood Updated: 10/18/20 1544     Glucose 94 mg/dL      BUN 8 mg/dL      Creatinine 0.83 mg/dL      Sodium 136 mmol/L      Potassium 3.9 mmol/L      Comment: Slight hemolysis detected by analyzer. Results may be affected.        Chloride 102 mmol/L      CO2 23.2 mmol/L      Calcium 9.4 mg/dL      Total Protein 7.6 g/dL      Albumin 4.24 g/dL      ALT (SGPT) 216 U/L      AST (SGOT) 141 U/L      Alkaline Phosphatase 120 U/L      Total Bilirubin 0.4 mg/dL      eGFR Non African Amer 101 mL/min/1.73      Globulin 3.4 gm/dL      A/G Ratio 1.3 g/dL      BUN/Creatinine Ratio 9.6     Anion Gap 10.8 mmol/L     Narrative:      GFR Normal >60  Chronic Kidney Disease <60  Kidney Failure <15      Lipase [[id number]]   (Normal) Collected: 10/18/20 1411    Specimen: Blood Updated: 10/18/20 1544     Lipase 47 U/L     Magnesium [[id number]]  (Normal) Collected: 10/18/20 1411    Specimen: Blood Updated: 10/18/20 1544     Magnesium 2.0 mg/dL     Ethanol [[id number]] Collected: 10/18/20 1411    Specimen: Blood Updated: 10/18/20 1544     Ethanol <10 mg/dL      Ethanol % <0.010 %     Narrative:      >/= 80.0 legally intoxicated    D-dimer, Quantitative [[id number]]  (Abnormal) Collected: 10/18/20 1411    Specimen: Blood Updated: 10/18/20 1544     D-Dimer, Quantitative 0.53 MCGFEU/mL     Narrative:      d-Dimer assay result is to be used in conjunction with a non-high clinical pretest probability (PTP) assessment tool to exclude PE and aid in diagnosis of VTE with cutoff of 0.5 MCGFEU/mL.    Urinalysis With Microscopic If Indicated (No Culture) - Urine, Clean Catch [[id number]]  (Abnormal) Collected: 10/18/20 1519    Specimen: Urine, Clean Catch Updated: 10/18/20 1542     Color, UA Dark Yellow     Appearance, UA Clear     pH, UA 6.0     Specific Gravity, UA 1.016     Glucose, UA Negative     Ketones, UA Negative     Bilirubin, UA Negative     Blood, UA Negative     Protein, UA Negative     Leuk Esterase, UA Small (1+)     Nitrite, UA Negative     Urobilinogen, UA 1.0 E.U./dL    Urinalysis, Microscopic Only - Urine, Clean Catch [[id number]] Collected: 10/18/20 1519    Specimen: Urine, Clean Catch Updated: 10/18/20 1542     RBC, UA 0-2 /HPF      WBC, UA 0-2 /HPF      Bacteria, UA None Seen /HPF      Squamous Epithelial Cells, UA 0-2 /HPF      Hyaline Casts, UA 3-6 /LPF      Methodology Automated Microscopy    CBC & Differential [[id number]]  (Abnormal) Collected: 10/18/20 1411    Specimen: Blood Updated: 10/18/20 1519    Narrative:      The following orders were created for panel order CBC & Differential.  Procedure                               Abnormality         Status                     ---------                               -----------          ------                     CBC Auto Differential[[id number]]        Abnormal            Final result                 Please view results for these tests on the individual orders.    CBC Auto Differential [[id number]]  (Abnormal) Collected: 10/18/20 1411    Specimen: Blood Updated: 10/18/20 1519     WBC 11.26 10*3/mm3      RBC 5.12 10*6/mm3      Hemoglobin 15.8 g/dL      Hematocrit 47.5 %      MCV 92.8 fL      MCH 30.9 pg      MCHC 33.3 g/dL      RDW 13.0 %      RDW-SD 44.7 fl      MPV 10.1 fL      Platelets 231 10*3/mm3      Neutrophil % 71.1 %      Lymphocyte % 16.3 %      Monocyte % 9.6 %      Eosinophil % 2.2 %      Basophil % 0.5 %      Immature Grans % 0.3 %      Neutrophils, Absolute 8.00 10*3/mm3      Lymphocytes, Absolute 1.84 10*3/mm3      Monocytes, Absolute 1.08 10*3/mm3      Eosinophils, Absolute 0.25 10*3/mm3      Basophils, Absolute 0.06 10*3/mm3      Immature Grans, Absolute 0.03 10*3/mm3      nRBC 0.0 /100 WBC     Patterson Draw [[id number]] Collected: 10/18/20 1411    Specimen: Blood Updated: 10/18/20 1515    Narrative:      The following orders were created for panel order Patterson Draw.  Procedure                               Abnormality         Status                     ---------                               -----------         ------                     Light Blue Top[[id number]]                                   Final result               Green Top (Gel)[[id number]]                                  Final result               Lavender Top[[id number]]                                     Final result               Gold Top - SST[[id number]]                                   Final result                 Please view results for these tests on the individual orders.    Light Blue Top [[id number]] Collected: 10/18/20 1411    Specimen: Blood Updated: 10/18/20 1515     Extra Tube hold for add-on     Comment: Auto resulted       Green Top (Gel) [[id number]] Collected: 10/18/20 1411    Specimen: Blood Updated:  10/18/20 1515     Extra Tube Hold for add-ons.     Comment: Auto resulted.       Lavender Top [[id number]] Collected: 10/18/20 141    Specimen: Blood Updated: 10/18/20 1515     Extra Tube hold for add-on     Comment: Auto resulted       Gold Top - SST [[id number]] Collected: 10/18/20 141    Specimen: Blood Updated: 10/18/20 1515     Extra Tube Hold for add-ons.     Comment: Auto resulted.             Operative/Procedure Notes (last 72 hours) (Notes from 10/16/20 0958 through 10/19/20 0958)    No notes of this type exist for this encounter.            Physician Progress Notes (last 72 hours) (Notes from 10/16/20 0958 through 10/19/20 0958)      Alfredo Rollins MD at 10/19/20 0841              [facility] HOSPITALIST PROGRESS NOTE     Patient Identification:  Name:  Neo Chung  Age:  44 y.o.  Sex:  male  :  1975  MRN:  [id number]  Visit Number:  [id number]  ROOM: [address]     Primary Care Provider:  Provider, No Known    Length of stay in inpatient status:  1    Subjective     Chief Compliant:    Chief Complaint   Patient presents with   • Syncope       History of Presenting Illness:    Patient reports feeling well this AM. No recurrent episodes of syncope or symptomatic bradycardia. HR in 60's-70's on my exam. He reports craving coffee. He also reports he splits his suboxone and takes BID at home.     ROS:  Otherwise 10 point ROS negative other than documented above in HPI.     Objective     Current Hospital Meds:buprenorphine-naloxone, 0.5 tablet, Sublingual, BID  enoxaparin, 40 mg, Subcutaneous, Q24H  sodium chloride, 10 mL, Intravenous, Q12H    sodium chloride, 125 mL/hr, Last Rate: 125 mL/hr (10/19/20 0413)        Current Antimicrobial Therapy:  Anti-Infectives (From admission, onward)    None        Current Diuretic Therapy:  Diuretics (From admission, onward)    None         ----------------------------------------------------------------------------------------------------------------------  Vital Signs:  Temp:  [97.7 °F (36.5 °C)-98.3 °F (36.8 °C)] 98 °F (36.7 °C)  Heart Rate:  [47-97] 50  Resp:  [14-20] 20  BP: ()/(49-86) 106/65  SpO2:  [92 %-100 %] 97 %  on  Flow (L/min):  [2] 2;   Device (Oxygen Therapy): nasal cannula  Body mass index is 23.44 kg/m².    Wt Readings from Last 3 Encounters:   10/18/20 72 kg (158 lb 11.7 oz)     Intake & Output (last 3 days)       10/16 0701 - 10/17 0700 10/17 0701 - 10/18 0700 10/18 0701 - 10/19 0700 10/19 0701 - 10/20 0700    I.V. (mL/kg)   2166.6 (30.1)     IV Piggyback   2000     Total Intake(mL/kg)   4166.6 (57.9)     Urine (mL/kg/hr)   1180     Total Output   1180     Net   +2986.6                 NPO Diet  ----------------------------------------------------------------------------------------------------------------------  Physical exam:  Constitutional:  Well-developed and well-nourished.  No respiratory distress.      HENT:  Head:  Normocephalic and atraumatic.  Mouth:  Moist mucous membranes.    Eyes:  Conjunctivae and EOM are normal. No scleral icterus.    Neck:  Neck supple.  No JVD present.    Cardiovascular:  Normal rate, regular rhythm and normal heart sounds with no murmur.  Pulmonary/Chest:  No respiratory distress, no wheezes, no crackles, with normal breath sounds and good air movement.  Abdominal:  Soft.  Bowel sounds are normal.  No distension and no tenderness.   Musculoskeletal:  No edema, no tenderness, and no deformity.  No red or swollen joints anywhere.    Neurological:  Alert and oriented to person, place, and time.  No cranial nerve deficit.  No tongue deviation.  No facial droop.  No slurred speech.   Skin:  Skin is warm and dry. No rash noted. No pallor.   Peripheral vascular:  Pulses in all 4 extremities with no clubbing, no cyanosis, no  edema.  ----------------------------------------------------------------------------------------------------------------------  Tele:    ----------------------------------------------------------------------------------------------------------------------  Results from last 7 days   Lab Units 10/19/20  0209 10/18/20  1411   WBC 10*3/mm3 10.26 11.26*   HEMOGLOBIN g/dL 14.4 15.8   HEMATOCRIT % 42.9 47.5   MCV fL 93.5 92.8   MCHC g/dL 33.6 33.3   PLATELETS 10*3/mm3 209 231         Results from last 7 days   Lab Units 10/19/20  0209 10/18/20  1411   SODIUM mmol/L 139 136   POTASSIUM mmol/L 4.2 3.9   MAGNESIUM mg/dL 2.0 2.0   CHLORIDE mmol/L 107 102   CO2 mmol/L 23.5 23.2   BUN mg/dL 7 8   CREATININE mg/dL 0.66* 0.83   EGFR IF NONAFRICN AM mL/min/1.73 132 101   CALCIUM mg/dL 8.8 9.4   GLUCOSE mg/dL 107* 94   ALBUMIN g/dL 3.59 4.24   BILIRUBIN mg/dL 0.4 0.4   ALK PHOS U/L 100 120*   AST (SGOT) U/L 112* 141*   ALT (SGPT) U/L 185* 216*   Estimated Creatinine Clearance: 147 mL/min (A) (by C-G formula based on SCr of 0.66 mg/dL (L)).  No results found for: AMMONIA  Results from last 7 days   Lab Units 10/19/20  0209 10/18/20  2043 10/18/20  1630   TROPONIN T ng/mL <0.010 <0.010 <0.010             No results found for: HGBA1C, POCGLU  Lab Results   Component Value Date    TSH 3.100 10/19/2020    FREET4 0.94 10/19/2020     No results found for: PREGTESTUR, PREGSERUM, HCG, HCGQUANT  Pain Management Panel     Pain Management Panel Latest Ref Rng & Units 10/18/2020    AMPHETAMINES SCREEN, URINE Negative Negative    BARBITURATES SCREEN Negative Negative    BENZODIAZEPINE SCREEN, URINE Negative Negative    BUPRENORPHINEUR Negative Positive(A)    COCAINE SCREEN, URINE Negative Negative    METHADONE SCREEN, URINE Negative Negative        Brief Urine Lab Results  (Last result in the past 365 days)      Color   Clarity   Blood   Leuk Est   Nitrite   Protein   CREAT   Urine HCG        10/18/20 1519 Dark Yellow Clear Negative Small (1+)  Negative Negative             No results found for: BLOODCX  No results found for: URINECX  No results found for: WOUNDCX  No results found for: STOOLCX  No results found for: RESPCX  No results found for: AFBCX        I have personally looked at the labs and they are summarized above.  ----------------------------------------------------------------------------------------------------------------------  Detailed radiology reports for the last 24 hours:    Imaging Results (Last 24 Hours)     Procedure Component Value Units Date/Time    CT Chest Pulmonary Embolism [[id number]] Collected: 10/18/20 1729     Updated: 10/18/20 1732    Narrative:      CT CHEST PULMONARY EMBOLISM W CONTRAST    INDICATION:   Syncope and elevated d-dimer.    TECHNIQUE:   CT angiogram of the chest during intravenous contrast administration. Dose recorded in the patient's chart. 3-D reconstructions were obtained and reviewed.   Radiation dose reduction techniques included automated exposure control or exposure modulation  based on body size. Count of known CT and cardiac nuc med studies performed in previous 12 months: 0.     COMPARISON:   None available.    FINDINGS:   There is no evidence for pulmonary embolism. There is a small hiatal hernia. There is no pericardial effusion or pleural effusion. There is no convincing evidence for thoracic aortic dissection. There are mild there is no axillary lymphadenopathy. There  are some prominent AP window lymph nodes which are nonspecific. There are some thickening of the central bronchovascular soft tissues at the lucia bilaterally but no discrete hilar lymphadenopathy. There is no pneumothorax. There is a noncalcified nodule  in the right upper lobe about 5 mm in diameter. There is an adjacent 4 mm noncalcified nodule. They are both probably along the minor fissure which favor subpleural lymph nodes. There is dependent atelectasis. There is no acute infiltrate otherwise.  Vascular calcifications  involving the coronary arteries.        Impression:        1. No evidence for pulmonary embolus.  2. Small noncalcified nodules right upper lobe largest 5 mm dimension. Follow-up recommended using Fleischner Society criteria. Management recommendation: Based on current published guidelines, uncomplicated pulmonary nodules less than 6 mm do not  require CT follow-up. In high risk patients, follow-up CT in 12 months is optional. (Fleischner Society guidelines, 2017). Management recommendation: Based on current published guidelines, uncomplicated pulmonary nodules less than 6 mm do not require CT  follow-up. In high risk patients, follow-up CT in 12 months is optional. (Fleischner Society guidelines, 2017).   3. Mild coronary artery vascular calcification. No convincing evidence for thoracic aortic dissection. No pleural or pericardial effusion. No pneumothorax.     Signer Name: Keren Zhou MD   Signed: 10/18/2020 5:29 PM   Workstation Name: [email or web address]    [facility]    CT Head Without Contrast [[id number]] Collected: 10/18/20 1712     Updated: 10/18/20 1714    Narrative:      HISTORY:   Seizure-like activity.    TECHNIQUE:   CT head without contrast. Radiation dose reduction techniques included automated exposure control or exposure modulation based on body size. Radiation audit for number of CT and nuclear cardiology exams performed in the last year: 0.      COMPARISON:   None    FINDINGS:   There is no displaced calvarial fracture. The mastoid air cells are clear. There is mild mucosal thickening in the ethmoid air cells but no air-fluid level in the visualized paranasal sinuses. There is no evidence for acute intracranial hemorrhage or  extra-axial fluid collection. The ventricles are normal in size and configuration. The gray-white junction is well-maintained. No acute cortical infarct is suspected. No intracranial mass effect. The basilar cisterns are patent.      Impression:      Negative,  normal noncontrast head CT. If this is new onset seizure disorder, follow-up best performed with MRI brain with and without contrast the patient is candidate.    Signer Name: Keren Zhou MD   Signed: 10/18/2020 5:12 PM   Workstation Name: [email or web address]    [facility]    XR Chest 1 View [[id number]] Collected: 10/18/20 1432     Updated: 10/18/20 1505    Narrative:      EXAMINATION: XR CHEST 1 VW-      CLINICAL INDICATION:     Syncope     TECHNIQUE:  XR CHEST 1 VW-      COMPARISON: NONE      FINDINGS:      Lungs are aerated.   Heart size, mediastinum, and pulmonary vascularity are unremarkable.   No pneumothorax.   No effusions.   No acute osseous findings.          Impression:      No radiographic evidence of acute cardiac or pulmonary  disease.     This report was finalized on 10/18/2020 2:32 PM by Dr. Feng Funk MD.           Assessment & Plan    #Symptomatic bradycardia   #pAfib?  - No history of tick bites, AV herman blockade agents. TSH borderline elevated initially, repeat normal. Unclear cause or exacerbating factors.  - Isolated EKG suggestive of possible Afib. [id number]: 0. Might represent sick sinus syndrome.    - Echo pending   - Cardiology consulted for potential pacemaker placement   - Will start dopamine infusion if repeat episodes occur  - Continue close monitoring with external pacemaker in place in CCU     #Mild hepatocellular transaminitis   #Hx of Hepatitis B?  - LFTs improving   - Hepatitis B C IGM positive   - Surface antigen confirmation pending   - Outpatient hepatology f/u     #5 mm pulmonary nodule   - Given patient is high risk, would recommend repeat CT in 1 year based on guidelines     #Hx of opiate use disorder   #THC use   - Will continue home suboxone     F: NPO, Maintenance fluids   E: Replace as needed   N: NPO     Code status: Full     Dispo: Continue CCU level care and monitoring for recurrent bradycardia     VTE Prophylaxis:   Mechanical Order History:      None      Pharmalogical Order History:      Ordered     Dose Route Frequency Stop    10/18/20 2009  enoxaparin (LOVENOX) syringe 40 mg      40 mg SC Every 24 Hours --                Alfredo Rollins MD  [facility]  10/19/20  08:41 EDT    Electronically signed by Alfredo Rollins MD at 10/19/20 0829     Augustine Uriostegui MD at 10/18/20 4945        Date of admission: 10/18/2020    Assisted by: Patient's significant other, permission to talk in front of her obtained from the patient, also later multiple staff members when the patient became severely bradycardic    Chief complaint: Passed out    History of present illness: Patient is a 44-year-old male who really he states is never seen a doctor with exception of at the Suboxone clinic who was in his usual state of health until the first time this happened today he had an episode where he felt hot and then became sweaty, he was driving a car but only going 2 to 3 miles an hour, his significant other was able to get it up into neutral and then parked, she states he passed out but was only out for about 15 minutes.  He was alert and oriented when he came to.  He had another episode when he walked in from the parking lot to our emergency room of similar nature, he became diaphoretic and then passed out.  He had another episode in the emergency room and then when I was actually in the room patient felt like it was happening again, his face turned red, he got very diaphoretic and then had a syncopal episode, monitor correlated at that time with a long pause that appeared to be asystole for a brief time.  I had gone out briefly to asked them to get another EKG as he looked like he was having an abnormal rhythm such as atrial fibrillation.  When he woke up, he was alert and oriented, there were no seizure movements noted, he denied any chest pain, he states he has been more short winded today.  PE protocol done out of the emergency room showed no PE.  Patient  "does use regular marijuana states he last smoked this morning but he has done this since he was a teenager.  He also takes regular Suboxone twice daily but he is not change the dosing of this recently.  He states at home he occasionally will see \"some spots that are very brief\" and occasionally feel little \"lightheaded\" but overall no complaints he is active without chest pain.    Past medical history:  Chronic pain  Otherwise no known past medical history    No known medical allergies    Social history: Positive for smoking, positive marijuana, no other illicit drugs, he used to drink alcohol but has not done this in a long time.    Family history: No premature coronary disease, father  of alcoholism, he states his mother is overall fairly healthy.    ROS: Negative except chronic pain and as per HPI.    Exam: When I initially saw him he was alert, skin warm and dry, answers questions appropriately, during this event he was diaphoretic flushed and unresponsive for a brief time.  When he did wake up he was oriented.  Hearing is intact conjunctiva unremarkable speech is normal face symmetric pupils equal no carotid bruits are heard neck is supple lungs have bilateral breath sounds are clear no rhonchi rales or wheezing heard, heart is an irregularly irregular rhythm without murmur rub or gallop, abdomen is soft benign bowel sounds active nondistended nontender, extremities with edema adequate distal pulses strength is symmetric neurologically nonfocal, mood is good      Data:  EKG obtained during the episode I witnessed by the time the EKG was hooked up it showed atrial fibrillation with a rate in the 70s image reviewed, the first 2 EKGs I have also reviewed and showed just sinus rhythm.    CT PE protocol showed small noncalcified nodules right upper lobe largest 5 mm, would recommend follow-up in 12 months.  He also was noted to have mild coronary vascular calcification    CT head negative    Chest x-ray " negative    Laboratory data: UA small amount of leukocyte Estrace otherwise negative, toxicology screen was positive for what he stated he took which was Suboxone and THC, microscopic of the urine unremarkable, transaminases elevated otherwise chemistries unremarkable, magnesium normal lipase 47 D-dimer minimally elevated 0.53, troponin negative, TSH mildly elevated at 6 free T4 was normal ethanol level was negative white count 11 hemoglobin 15 hematocrit 47, platelet count 231    Assessment and plan:  Symptomatic bradycardia with associated syncope, this was witnessed on the monitor, he had a very long pause that almost appeared to be asystole for a brief time.  This was brief and he came back with an adequate rate but was atrial fibrillation.  The exact etiology of this bradycardia is uncertain, both THC and Suboxone certainly have been known to cause bradycardia but he did not take any new dose.  His TSH is 6 which I am going to repeat but this would not be enough to cause this as well.  Electrolytes are unremarkable.  External pacemaker has been placed, this is to be turned on if he gets a heart rate below 50, he is being admitted to the CCU, briefly discussed this with cardiology, if the patient become symptomatic again would consider dopamine infusion.  Echocardiogram will be checked, will track serial troponins.  Repeat thyroid in the morning.  Patient because his significant other could not stay with him stated initially he was going to leave.  I did explain that he would most like die if he left, he has agreed to stay.  His [id number] score is 0 so I have not fully anticoagulated him considering he also had a negative PE protocol.    Elevated transaminases, possibly underlying hepatitis C, asymptomatic, I have ordered hepatitis profile.    DVT prophylaxis, subcu Lovenox    Small 5 mm largest pulmonary nodule, would recommend repeat CT in approximately 1 year.        Electronically signed by Augustine Uriostegui  MD at 10/18/20 1830       Scheduled Meds Sorted by Name  for GivensNeo as of 10/17/20 through 10/19/20    1 Day 3 Days 7 Days 10 Days <  Today >     Legend:                           Inactive     Active     Other Encounter     Linked                 Medications 10/17/20 10/18/20 10/19/20   buprenorphine-naloxone (SUBOXONE) 8-2 MG per SL tablet 0.5 tablet   Dose: 0.5 tablet  Freq: 2 times daily Route: SL  Indications of Use: Opioid Use Disorder (Continuation of Therapy)  Start: 10/19/20 0930    Admin Instructions:   Hold for oversedation, SBP<100  If given for pain, use the following pain scale:  Mild Pain = Pain Score of 1-3, CPOT 1-2  Moderate Pain = Pain Score of 4-6, CPOT 3-4  Severe Pain = Pain Score of 7-10, CPOT 5-8      0834   2100          buprenorphine-naloxone (SUBOXONE) 8-2 MG per SL tablet 1 tablet   Dose: 1 tablet  Freq: Once Route: SL  Indications of Use: Opioid Use Disorder (Continuation of Therapy)  Start: 10/18/20 2215 End: 10/18/20 2236    Admin Instructions:   If given for pain, use the following pain scale:  Mild Pain = Pain Score of 1-3, CPOT 1-2  Moderate Pain = Pain Score of 4-6, CPOT 3-4  Severe Pain = Pain Score of 7-10, CPOT 5-8     2236             buprenorphine-naloxone (SUBOXONE) 8-2 MG per SL tablet 1 tablet   Dose: 1 tablet  Freq: Daily Route: SL  Indications of Use: Opioid Use Disorder (Continuation of Therapy)  Start: 10/19/20 0900 End: 10/19/20 0831    Admin Instructions:   Hold for oversedation, SBP<100  If given for pain, use the following pain scale:  Mild Pain = Pain Score of 1-3, CPOT 1-2  Moderate Pain = Pain Score of 4-6, CPOT 3-4  Severe Pain = Pain Score of 7-10, CPOT 5-8      0831-D/C'd          buprenorphine-naloxone (SUBOXONE) 8-2 MG per SL tablet 1 tablet   Dose: 1 tablet  Freq: Daily Route: SL  Indications of Use: Opioid Use Disorder (Continuation of Therapy)  Start: 10/19/20 0900 End: 10/18/20 2149    Admin Instructions:   Hold for oversedation, SBP<100  If given  for pain, use the following pain scale:  Mild Pain = Pain Score of 1-3, CPOT 1-2  Moderate Pain = Pain Score of 4-6, CPOT 3-4  Severe Pain = Pain Score of 7-10, CPOT 5-8     2149-D/C'd           enoxaparin (LOVENOX) syringe 40 mg   Dose: 40 mg  Freq: Every 24 Hours Route: SC  Indications of Use: PROPHYLAXIS OF VENOUS THROMBOEMBOLISM  Start: 10/18/20 2200    Admin Instructions:   Give subcutaneous in abdomen only. Do not massage site after injection.     2235 2200            iopamidol (ISOVUE-370) 76 % injection 100 mL   Dose: 100 mL  Freq: Once in Imaging Route: IV  Start: 10/18/20 1653 End: 10/18/20 1653     1653             sodium chloride 0.9 % bolus 1,000 mL   Dose: 1,000 mL  Freq: Once Route: IV  Last Dose: Stopped (10/18/20 1530)  Start: 10/18/20 1405 End: 10/18/20 1530     1415   1530           sodium chloride 0.9 % flush 10 mL   Dose: 10 mL  Freq: Every 12 Hours Scheduled Route: IV  Start: 10/18/20 2100     2015          0835   2100          Medications 10/17/20 10/18/20 10/19/20       Continuous Meds Sorted by Name  for Neo Chung as of 10/17/20 through 10/19/20   Legend:                           Inactive     Active     Other Encounter     Linked                 Medications 10/17/20 10/18/20 10/19/20   sodium chloride 0.9 % infusion   Rate: 100 mL/hr Dose: 100 mL/hr  Freq: Continuous Route: IV  Last Dose: 100 mL/hr (10/18/20 2012)  Start: 10/18/20 2100 End: 10/18/20 2149     2012   2149-D/C'd         sodium chloride 0.9 % infusion   Rate: 125 mL/hr Dose: 125 mL/hr  Freq: Continuous Route: IV  Last Dose: 125 mL/hr (10/19/20 0413)  Start: 10/18/20 1405     1504   1710   2359      0413                PRN Meds Sorted by Name  for Neo Chung as of 10/17/20 through 10/19/20   Legend:                           Inactive     Active     Other Encounter     Linked                 Medications 10/17/20 10/18/20 10/19/20   sodium chloride 0.9 % flush 10 mL   Dose: 10 mL  Freq: As Needed Route: IV  PRN  Reason: Line Care  PRN Comment: After Medication Administration or Blood Draw  Start: 10/18/20 2140         sodium chloride 0.9 % flush 10 mL   Dose: 10 mL  Freq: As Needed Route: IV  PRN Reason: Line Care  Start: 10/18/20 2009

## 2020-10-19 NOTE — PROGRESS NOTES
[facility] HOSPITALIST PROGRESS NOTE     Patient Identification:  Name:  Neo Chung  Age:  44 y.o.  Sex:  male  :  1975  MRN:  [id number]  Visit Number:  [id number]  ROOM: [address]     Primary Care Provider:  Provider, No Known    Length of stay in inpatient status:  1    Subjective     Chief Compliant:    Chief Complaint   Patient presents with   • Syncope       History of Presenting Illness:    Patient reports feeling well this AM. No recurrent episodes of syncope or symptomatic bradycardia. HR in 60's-70's on my exam. He reports craving coffee. He also reports he splits his suboxone and takes BID at home.     ROS:  Otherwise 10 point ROS negative other than documented above in HPI.     Objective     Current Hospital Meds:buprenorphine-naloxone, 0.5 tablet, Sublingual, BID  enoxaparin, 40 mg, Subcutaneous, Q24H  sodium chloride, 10 mL, Intravenous, Q12H    sodium chloride, 125 mL/hr, Last Rate: 125 mL/hr (10/19/20 0413)        Current Antimicrobial Therapy:  Anti-Infectives (From admission, onward)    None        Current Diuretic Therapy:  Diuretics (From admission, onward)    None        ----------------------------------------------------------------------------------------------------------------------  Vital Signs:  Temp:  [97.7 °F (36.5 °C)-98.3 °F (36.8 °C)] 98 °F (36.7 °C)  Heart Rate:  [47-97] 50  Resp:  [14-20] 20  BP: ()/(49-86) 106/65  SpO2:  [92 %-100 %] 97 %  on  Flow (L/min):  [2] 2;   Device (Oxygen Therapy): nasal cannula  Body mass index is 23.44 kg/m².    Wt Readings from Last 3 Encounters:   10/18/20 72 kg (158 lb 11.7 oz)     Intake & Output (last 3 days)       10/16 0701 - 10/17 0700 10/17 0701 - 10/18 0700 10/18 0701 - 10/19 0700 10/19 0701 - 10/20 07    I.V. (mL/kg)   2166.6 (30.1)     IV Piggyback   2000     Total Intake(mL/kg)   4166.6 (57.9)     Urine (mL/kg/hr)   1180     Total Output   1180     Net   +2986.6                 NPO  Diet  ----------------------------------------------------------------------------------------------------------------------  Physical exam:  Constitutional:  Well-developed and well-nourished.  No respiratory distress.      HENT:  Head:  Normocephalic and atraumatic.  Mouth:  Moist mucous membranes.    Eyes:  Conjunctivae and EOM are normal. No scleral icterus.    Neck:  Neck supple.  No JVD present.    Cardiovascular:  Normal rate, regular rhythm and normal heart sounds with no murmur.  Pulmonary/Chest:  No respiratory distress, no wheezes, no crackles, with normal breath sounds and good air movement.  Abdominal:  Soft.  Bowel sounds are normal.  No distension and no tenderness.   Musculoskeletal:  No edema, no tenderness, and no deformity.  No red or swollen joints anywhere.    Neurological:  Alert and oriented to person, place, and time.  No cranial nerve deficit.  No tongue deviation.  No facial droop.  No slurred speech.   Skin:  Skin is warm and dry. No rash noted. No pallor.   Peripheral vascular:  Pulses in all 4 extremities with no clubbing, no cyanosis, no edema.  ----------------------------------------------------------------------------------------------------------------------  Tele:    ----------------------------------------------------------------------------------------------------------------------  Results from last 7 days   Lab Units 10/19/20  0209 10/18/20  1411   WBC 10*3/mm3 10.26 11.26*   HEMOGLOBIN g/dL 14.4 15.8   HEMATOCRIT % 42.9 47.5   MCV fL 93.5 92.8   MCHC g/dL 33.6 33.3   PLATELETS 10*3/mm3 209 231         Results from last 7 days   Lab Units 10/19/20  0209 10/18/20  1411   SODIUM mmol/L 139 136   POTASSIUM mmol/L 4.2 3.9   MAGNESIUM mg/dL 2.0 2.0   CHLORIDE mmol/L 107 102   CO2 mmol/L 23.5 23.2   BUN mg/dL 7 8   CREATININE mg/dL 0.66* 0.83   EGFR IF NONAFRICN AM mL/min/1.73 132 101   CALCIUM mg/dL 8.8 9.4   GLUCOSE mg/dL 107* 94   ALBUMIN g/dL 3.59 4.24   BILIRUBIN mg/dL 0.4 0.4    ALK PHOS U/L 100 120*   AST (SGOT) U/L 112* 141*   ALT (SGPT) U/L 185* 216*   Estimated Creatinine Clearance: 147 mL/min (A) (by C-G formula based on SCr of 0.66 mg/dL (L)).  No results found for: AMMONIA  Results from last 7 days   Lab Units 10/19/20  0209 10/18/20  2043 10/18/20  1630   TROPONIN T ng/mL <0.010 <0.010 <0.010             No results found for: HGBA1C, POCGLU  Lab Results   Component Value Date    TSH 3.100 10/19/2020    FREET4 0.94 10/19/2020     No results found for: PREGTESTUR, PREGSERUM, HCG, HCGQUANT  Pain Management Panel     Pain Management Panel Latest Ref Rng & Units 10/18/2020    AMPHETAMINES SCREEN, URINE Negative Negative    BARBITURATES SCREEN Negative Negative    BENZODIAZEPINE SCREEN, URINE Negative Negative    BUPRENORPHINEUR Negative Positive(A)    COCAINE SCREEN, URINE Negative Negative    METHADONE SCREEN, URINE Negative Negative        Brief Urine Lab Results  (Last result in the past 365 days)      Color   Clarity   Blood   Leuk Est   Nitrite   Protein   CREAT   Urine HCG        10/18/20 1519 Dark Yellow Clear Negative Small (1+) Negative Negative             No results found for: BLOODCX  No results found for: URINECX  No results found for: WOUNDCX  No results found for: STOOLCX  No results found for: RESPCX  No results found for: AFBCX        I have personally looked at the labs and they are summarized above.  ----------------------------------------------------------------------------------------------------------------------  Detailed radiology reports for the last 24 hours:    Imaging Results (Last 24 Hours)     Procedure Component Value Units Date/Time    CT Chest Pulmonary Embolism [[id number]] Collected: 10/18/20 1729     Updated: 10/18/20 1732    Narrative:      CT CHEST PULMONARY EMBOLISM W CONTRAST    INDICATION:   Syncope and elevated d-dimer.    TECHNIQUE:   CT angiogram of the chest during intravenous contrast administration. Dose recorded in the patient's chart.  3-D reconstructions were obtained and reviewed.   Radiation dose reduction techniques included automated exposure control or exposure modulation  based on body size. Count of known CT and cardiac nuc med studies performed in previous 12 months: 0.     COMPARISON:   None available.    FINDINGS:   There is no evidence for pulmonary embolism. There is a small hiatal hernia. There is no pericardial effusion or pleural effusion. There is no convincing evidence for thoracic aortic dissection. There are mild there is no axillary lymphadenopathy. There  are some prominent AP window lymph nodes which are nonspecific. There are some thickening of the central bronchovascular soft tissues at the lucia bilaterally but no discrete hilar lymphadenopathy. There is no pneumothorax. There is a noncalcified nodule  in the right upper lobe about 5 mm in diameter. There is an adjacent 4 mm noncalcified nodule. They are both probably along the minor fissure which favor subpleural lymph nodes. There is dependent atelectasis. There is no acute infiltrate otherwise.  Vascular calcifications involving the coronary arteries.        Impression:        1. No evidence for pulmonary embolus.  2. Small noncalcified nodules right upper lobe largest 5 mm dimension. Follow-up recommended using Fleischner Society criteria. Management recommendation: Based on current published guidelines, uncomplicated pulmonary nodules less than 6 mm do not  require CT follow-up. In high risk patients, follow-up CT in 12 months is optional. (Fleischner Society guidelines, 2017). Management recommendation: Based on current published guidelines, uncomplicated pulmonary nodules less than 6 mm do not require CT  follow-up. In high risk patients, follow-up CT in 12 months is optional. (Fleischner Society guidelines, 2017).   3. Mild coronary artery vascular calcification. No convincing evidence for thoracic aortic dissection. No pleural or pericardial effusion. No  pneumothorax.     Signer Name: Keren Zhou MD   Signed: 10/18/2020 5:29 PM   Workstation Name: [facility]    Radiology [facility]    CT Head Without Contrast [[id number]] Collected: 10/18/20 1712     Updated: 10/18/20 1714    Narrative:      HISTORY:   Seizure-like activity.    TECHNIQUE:   CT head without contrast. Radiation dose reduction techniques included automated exposure control or exposure modulation based on body size. Radiation audit for number of CT and nuclear cardiology exams performed in the last year: 0.      COMPARISON:   None    FINDINGS:   There is no displaced calvarial fracture. The mastoid air cells are clear. There is mild mucosal thickening in the ethmoid air cells but no air-fluid level in the visualized paranasal sinuses. There is no evidence for acute intracranial hemorrhage or  extra-axial fluid collection. The ventricles are normal in size and configuration. The gray-white junction is well-maintained. No acute cortical infarct is suspected. No intracranial mass effect. The basilar cisterns are patent.      Impression:      Negative, normal noncontrast head CT. If this is new onset seizure disorder, follow-up best performed with MRI brain with and without contrast the patient is candidate.    Signer Name: Keren Zhou MD   Signed: 10/18/2020 5:12 PM   Workstation Name: [facility]    Radiology [facility]    XR Chest 1 View [[id number]] Collected: 10/18/20 1432     Updated: 10/18/20 1505    Narrative:      EXAMINATION: XR CHEST 1 VW-      CLINICAL INDICATION:     Syncope     TECHNIQUE:  XR CHEST 1 VW-      COMPARISON: NONE      FINDINGS:      Lungs are aerated.   Heart size, mediastinum, and pulmonary vascularity are unremarkable.   No pneumothorax.   No effusions.   No acute osseous findings.          Impression:      No radiographic evidence of acute cardiac or pulmonary  disease.     This report was finalized on 10/18/2020 2:32 PM by Dr. Feng Funk MD.            Assessment & Plan    #Symptomatic bradycardia   #pAfib?  - No history of tick bites, AV herman blockade agents. TSH borderline elevated initially, repeat normal. Unclear cause or exacerbating factors.  - Isolated EKG suggestive of possible Afib. [id number]: 0. Might represent sick sinus syndrome.    - Echo pending   - Cardiology consulted for potential pacemaker placement   - Will start dopamine infusion if repeat episodes occur  - Continue close monitoring with external pacemaker in place in CCU     #Mild hepatocellular transaminitis   #Hx of Hepatitis B?  - LFTs improving   - Hepatitis B C IGM positive   - Surface antigen confirmation pending   - Outpatient hepatology f/u     #5 mm pulmonary nodule   - Given patient is high risk, would recommend repeat CT in 1 year based on guidelines     #Hx of opiate use disorder   #THC use   - Will continue home suboxone     F: NPO, Maintenance fluids   E: Replace as needed   N: NPO     Code status: Full     Dispo: Continue CCU level care and monitoring for recurrent bradycardia     VTE Prophylaxis:   Mechanical Order History:     None      Pharmalogical Order History:      Ordered     Dose Route Frequency Stop    10/18/20 2009  enoxaparin (LOVENOX) syringe 40 mg      40 mg SC Every 24 Hours --                Alfredo Rollins MD  [facility] Hospitalist  10/19/20  08:41 EDT

## 2020-10-19 NOTE — NURSING NOTE
Pt belongings at bedside, pants, shoes, keys, cell phone, and . 1.5 Suboxone tabs sent to pharmacy.

## 2020-10-19 NOTE — PLAN OF CARE
Goal Outcome Evaluation:  Plan of Care Reviewed With: patient  Progress: improving  Outcome Summary: Pt has remained asymptomatic sinus bradycardia, normotensive. Pt remains on 2L NC. VSS. Will continue to monitor.

## 2020-10-19 NOTE — CONSULTS
Date of Admit: 10/18/2020  Date of Consult: 10/19/20  No ref. provider found        Symptomatic bradycardia      Assessment      1. Symptomatic bradycardia with syncope  2. Paroxysmal atrial fibrillation, now back in NSR, CHADS-VASc score of 0  3. History of opiate use disorder, UDS positive for THC and buprenorphine     Recommendations     1. We will get an echocardiogram to assess his cardiac function patient insisted he did not use any excellent results except yes he smokes occasional cannabis he had 1 on the day of syncope patient may eventually require pacemaker implantation  2. He had a short episode of atrial fibrillation post pelvis however he has [id number] of 0  3. Advised to quit smoking especially not use the nicotine with smoking today with the NicoDerm patch        Reason for consultation: Symptomatic bradycardia    Subjective       Subjective     History of Present Illness     Neo Chung is a 44-year-old male with a past medical history significant for chronic pain. Patient presented to the ED with complaints of syncope. He states he was driving down the road and became diaphoretic and he passed out for about 15 minutes. He states his wife said he was talking to her and then just passed out. He had no symptoms prior to the event happening. The wife was able to slow down the car safely. He also had another similar episode while walking into the hospital. He reports he was feeling well up until the last 2 days when he started getting swelling in his hands and soreness of his joints all over his body. She denies any chest pain, shortness of breath, dizziness or lightheadedness. His past medical history is unremarkable other than chronic pain. He is an overall active person.     Per chart review patient had a syncopal episode in the ER which was correlated with a long pause that appeared asystole for a brief time and then he went into atrial fibrillation but has since went into normal sinus rhythm.  There was no seizure activity noted. Patient reported that he does use marijuana regularly and smoked yesterday morning. He only smoked 1 which is normal for him. He states he has been smoking for many years.  He also takes suboxone regularly and has not had a change in dose. He recently was trying to quit smoking and was wearing a nicotine patch yesterday while smoking when the event happened,.     Cardiac risk factors:smoking      No past medical history on file.  No past surgical history on file.  No family history on file.     Social History     Tobacco Use   • Smoking status: Current Every Day Smoker     Packs/day: 1.00     Years: 30.00     Pack years: 30.00     Types: Cigarettes   • Smokeless tobacco: Never Used   Substance Use Topics   • Alcohol use: Not on file   • Drug use: Not on file     Medications Prior to Admission   Medication Sig Dispense Refill Last Dose   • buprenorphine-naloxone (SUBOXONE) 8-2 MG per SL tablet Place 1 tablet under the tongue Daily.   10/18/2020 at AM     Allergies:  Patient has no known allergies.    Review of Systems   Constitutional: Negative for chills, diaphoresis, fatigue and fever.   HENT: Negative for congestion and trouble swallowing.    Eyes: Negative for photophobia and visual disturbance.   Respiratory: Negative for cough, chest tightness, shortness of breath and wheezing.    Cardiovascular: Negative for chest pain, palpitations and leg swelling.   Gastrointestinal: Negative for abdominal distention, abdominal pain, nausea and vomiting.   Endocrine: Negative for polyphagia and polyuria.   Genitourinary: Negative for dysuria and hematuria.   Musculoskeletal: Negative for neck pain and neck stiffness.   Skin: Negative for rash and wound.   Allergic/Immunologic: Negative for food allergies and immunocompromised state.   Neurological: Positive for dizziness, syncope and light-headedness. Negative for weakness.   Hematological: Negative for adenopathy. Does not bruise/bleed  easily.   Psychiatric/Behavioral: Negative for confusion and suicidal ideas.       Objective       Objective      Vital Signs  Temp:  [97.7 °F (36.5 °C)-98.3 °F (36.8 °C)] 98 °F (36.7 °C)  Heart Rate:  [47-97] 52  Resp:  [14-20] 18  BP: ()/(49-86) 114/71     Vital Signs (last 72 hrs)       10/16 0700  -  10/17 0659 10/17 0700  -  10/18 0659 10/18 0700  -  10/19 0659 10/19 0700  -  10/19 1102   Most Recent    Temp (°F)     97.7 -  98.3      98     98 (36.7)    Heart Rate     47 -  97    48 -  78     52    Resp     14 -  18    16 -  20     18    BP     90/49 -  127/69    104/72 -  117/85     114/71    SpO2 (%)     92 -  100    97 -  98     98        Body mass index is 23.44 kg/m².    Intake/Output Summary (Last 24 hours) at 10/19/2020 1102  Last data filed at 10/19/2020 1022  Gross per 24 hour   Intake 4166.6 ml   Output 1730 ml   Net 2436.6 ml     Physical Exam  Constitutional:       General: He is not in acute distress.     Appearance: Normal appearance. He is well-developed and normal weight.   HENT:      Head: Normocephalic and atraumatic.      Nose: Nose normal.      Mouth/Throat:      Mouth: Mucous membranes are moist.   Eyes:      Extraocular Movements: Extraocular movements intact.      Pupils: Pupils are equal, round, and reactive to light.   Neck:      Musculoskeletal: Normal range of motion.      Vascular: No JVD.   Cardiovascular:      Rate and Rhythm: Normal rate and regular rhythm.      Heart sounds: No murmur. No S3 or S4 sounds.    Pulmonary:      Effort: Pulmonary effort is normal. No respiratory distress.      Breath sounds: Normal breath sounds. No wheezing.   Abdominal:      General: Bowel sounds are normal. There is no distension.      Palpations: Abdomen is soft. There is no hepatomegaly.      Tenderness: There is no abdominal tenderness.   Musculoskeletal: Normal range of motion.         General: No swelling.   Lymphadenopathy:      Cervical: No cervical adenopathy.   Skin:     General:  Skin is warm and dry.      Coloration: Skin is not pale.   Neurological:      General: No focal deficit present.      Mental Status: He is alert and oriented to person, place, and time. Mental status is at baseline.   Psychiatric:         Mood and Affect: Mood normal.         Behavior: Behavior normal.         Thought Content: Thought content normal.         Judgment: Judgment normal.         Results review     Results Review:    I reviewed the patient's new clinical results.  Results from last 7 days   Lab Units 10/19/20  0951 10/19/20  0209 10/18/20  2043 10/18/20  1630 10/18/20  1411   TROPONIN T ng/mL <0.010 <0.010 <0.010 <0.010 <0.010     Results from last 7 days   Lab Units 10/19/20  0209 10/18/20  1411   WBC 10*3/mm3 10.26 11.26*   HEMOGLOBIN g/dL 14.4 15.8   PLATELETS 10*3/mm3 209 231     Results from last 7 days   Lab Units 10/19/20  0209 10/18/20  1411   SODIUM mmol/L 139 136   POTASSIUM mmol/L 4.2 3.9   CHLORIDE mmol/L 107 102   CO2 mmol/L 23.5 23.2   BUN mg/dL 7 8   CREATININE mg/dL 0.66* 0.83   CALCIUM mg/dL 8.8 9.4   GLUCOSE mg/dL 107* 94   ALT (SGPT) U/L 185* 216*   AST (SGOT) U/L 112* 141*     No results found for: INR  Lab Results   Component Value Date    MG 2.0 10/19/2020    MG 2.0 10/18/2020     Lab Results   Component Value Date    TSH 3.100 10/19/2020      ECG  ECG/EMG Results (last 24 hours)     Procedure Component Value Units Date/Time    ECG 12 Lead [[id number]] Collected: 10/18/20 1323     Updated: 10/18/20 1404    Narrative:      Test Reason : syncope  Blood Pressure : **/** mmHG  Vent. Rate : 059 BPM     Atrial Rate : 059 BPM     P-R Int : 142 ms          QRS Dur : 088 ms      QT Int : 420 ms       P-R-T Axes : 058 073 077 degrees     QTc Int : 415 ms    Sinus bradycardia  Otherwise normal ECG  No previous ECGs available  Confirmed by Dougie Lopez ([id number]) on 10/18/2020 2:04:32 PM    Referred By:  VERONICA           Confirmed By:Dougie Lopez    ECG 12 Lead [[id number]] Collected: 10/18/20 1455      Updated: 10/18/20 1456    ECG 12 Lead [[id number]] Collected: 10/18/20 1753     Updated: 10/18/20 2002          Imaging Results (Last 72 Hours)     Procedure Component Value Units Date/Time    CT Chest Pulmonary Embolism [[id number]] Collected: 10/18/20 1729     Updated: 10/18/20 1732    Narrative:      CT CHEST PULMONARY EMBOLISM W CONTRAST    INDICATION:   Syncope and elevated d-dimer.    TECHNIQUE:   CT angiogram of the chest during intravenous contrast administration. Dose recorded in the patient's chart. 3-D reconstructions were obtained and reviewed.   Radiation dose reduction techniques included automated exposure control or exposure modulation  based on body size. Count of known CT and cardiac nuc med studies performed in previous 12 months: 0.     COMPARISON:   None available.    FINDINGS:   There is no evidence for pulmonary embolism. There is a small hiatal hernia. There is no pericardial effusion or pleural effusion. There is no convincing evidence for thoracic aortic dissection. There are mild there is no axillary lymphadenopathy. There  are some prominent AP window lymph nodes which are nonspecific. There are some thickening of the central bronchovascular soft tissues at the lucia bilaterally but no discrete hilar lymphadenopathy. There is no pneumothorax. There is a noncalcified nodule  in the right upper lobe about 5 mm in diameter. There is an adjacent 4 mm noncalcified nodule. They are both probably along the minor fissure which favor subpleural lymph nodes. There is dependent atelectasis. There is no acute infiltrate otherwise.  Vascular calcifications involving the coronary arteries.        Impression:        1. No evidence for pulmonary embolus.  2. Small noncalcified nodules right upper lobe largest 5 mm dimension. Follow-up recommended using Fleischner Society criteria. Management recommendation: Based on current published guidelines, uncomplicated pulmonary nodules less than 6 mm do  not  require CT follow-up. In high risk patients, follow-up CT in 12 months is optional. (Fleischner Society guidelines, 2017). Management recommendation: Based on current published guidelines, uncomplicated pulmonary nodules less than 6 mm do not require CT  follow-up. In high risk patients, follow-up CT in 12 months is optional. (Fleischner Society guidelines, 2017).   3. Mild coronary artery vascular calcification. No convincing evidence for thoracic aortic dissection. No pleural or pericardial effusion. No pneumothorax.     Signer Name: Keren Zhou MD   Signed: 10/18/2020 5:29 PM   Workstation Name: [email or web address]    Radiology [facility]    CT Head Without Contrast [[id number]] Collected: 10/18/20 1712     Updated: 10/18/20 1714    Narrative:      HISTORY:   Seizure-like activity.    TECHNIQUE:   CT head without contrast. Radiation dose reduction techniques included automated exposure control or exposure modulation based on body size. Radiation audit for number of CT and nuclear cardiology exams performed in the last year: 0.      COMPARISON:   None    FINDINGS:   There is no displaced calvarial fracture. The mastoid air cells are clear. There is mild mucosal thickening in the ethmoid air cells but no air-fluid level in the visualized paranasal sinuses. There is no evidence for acute intracranial hemorrhage or  extra-axial fluid collection. The ventricles are normal in size and configuration. The gray-white junction is well-maintained. No acute cortical infarct is suspected. No intracranial mass effect. The basilar cisterns are patent.      Impression:      Negative, normal noncontrast head CT. If this is new onset seizure disorder, follow-up best performed with MRI brain with and without contrast the patient is candidate.    Signer Name: Keren Zhou MD   Signed: 10/18/2020 5:12 PM   Workstation Name: [employer] [facility]    Radiology [facility]    XR Chest 1 View [[id number]] Collected: 10/18/20  1432     Updated: 10/18/20 1505    Narrative:      EXAMINATION: XR CHEST 1 VW-      CLINICAL INDICATION:     Syncope     TECHNIQUE:  XR CHEST 1 VW-      COMPARISON: NONE      FINDINGS:      Lungs are aerated.   Heart size, mediastinum, and pulmonary vascularity are unremarkable.   No pneumothorax.   No effusions.   No acute osseous findings.          Impression:      No radiographic evidence of acute cardiac or pulmonary  disease.     This report was finalized on 10/18/2020 2:32 PM by Dr. Feng Funk MD.             I have discussed my impression and recommendations with the patient and family.    Thank you very much for asking us to be involved in this patient's care.  We will follow along with you.      TAE Foster , acting as scribe for Dr. John Constantino MD [facility]  10/19/20  11:02 EDT  IJohn MD, [facility], performed the services described in this documentation as documented by the above-named individual under my supervision and made necessary changes in the note is both accurate and complete  Please note that portions of this note were completed with a voice recognition program.

## 2020-10-19 NOTE — PLAN OF CARE
Goal Outcome Evaluation:  Plan of Care Reviewed With: patient  Progress: improving  Outcome Summary: VSS. Asymptomatic SB on monitor. RA. Echo done today. Suboxone given. No complaints. Will continue to monitor.

## 2020-10-20 VITALS
RESPIRATION RATE: 16 BRPM | HEIGHT: 69 IN | BODY MASS INDEX: 23.6 KG/M2 | DIASTOLIC BLOOD PRESSURE: 68 MMHG | SYSTOLIC BLOOD PRESSURE: 102 MMHG | OXYGEN SATURATION: 93 % | WEIGHT: 159.3 LBS | TEMPERATURE: 97.9 F | HEART RATE: 51 BPM

## 2020-10-20 LAB
ALBUMIN SERPL-MCNC: 3.75 G/DL (ref 3.5–5.2)
ALBUMIN/GLOB SERPL: 1.2 G/DL
ALP SERPL-CCNC: 103 U/L (ref 39–117)
ALT SERPL W P-5'-P-CCNC: 255 U/L (ref 1–41)
ANION GAP SERPL CALCULATED.3IONS-SCNC: 10.2 MMOL/L (ref 5–15)
AST SERPL-CCNC: 169 U/L (ref 1–40)
BASOPHILS # BLD AUTO: 0.06 10*3/MM3 (ref 0–0.2)
BASOPHILS NFR BLD AUTO: 0.5 % (ref 0–1.5)
BILIRUB SERPL-MCNC: 0.6 MG/DL (ref 0–1.2)
BUN SERPL-MCNC: 10 MG/DL (ref 6–20)
BUN/CREAT SERPL: 12.3 (ref 7–25)
CALCIUM SPEC-SCNC: 9 MG/DL (ref 8.6–10.5)
CHLORIDE SERPL-SCNC: 102 MMOL/L (ref 98–107)
CO2 SERPL-SCNC: 23.8 MMOL/L (ref 22–29)
CREAT SERPL-MCNC: 0.81 MG/DL (ref 0.76–1.27)
DEPRECATED RDW RBC AUTO: 43.8 FL (ref 37–54)
EOSINOPHIL # BLD AUTO: 0.27 10*3/MM3 (ref 0–0.4)
EOSINOPHIL NFR BLD AUTO: 2.4 % (ref 0.3–6.2)
ERYTHROCYTE [DISTWIDTH] IN BLOOD BY AUTOMATED COUNT: 12.8 % (ref 12.3–15.4)
GFR SERPL CREATININE-BSD FRML MDRD: 104 ML/MIN/1.73
GLOBULIN UR ELPH-MCNC: 3.1 GM/DL
GLUCOSE SERPL-MCNC: 92 MG/DL (ref 65–99)
HCT VFR BLD AUTO: 43.6 % (ref 37.5–51)
HGB BLD-MCNC: 14.5 G/DL (ref 13–17.7)
IMM GRANULOCYTES # BLD AUTO: 0.03 10*3/MM3 (ref 0–0.05)
IMM GRANULOCYTES NFR BLD AUTO: 0.3 % (ref 0–0.5)
INR PPP: 1.02 (ref 0.9–1.1)
LYMPHOCYTES # BLD AUTO: 1.94 10*3/MM3 (ref 0.7–3.1)
LYMPHOCYTES NFR BLD AUTO: 17 % (ref 19.6–45.3)
MCH RBC QN AUTO: 31.1 PG (ref 26.6–33)
MCHC RBC AUTO-ENTMCNC: 33.3 G/DL (ref 31.5–35.7)
MCV RBC AUTO: 93.6 FL (ref 79–97)
MONOCYTES # BLD AUTO: 1.34 10*3/MM3 (ref 0.1–0.9)
MONOCYTES NFR BLD AUTO: 11.7 % (ref 5–12)
NEUTROPHILS NFR BLD AUTO: 68.1 % (ref 42.7–76)
NEUTROPHILS NFR BLD AUTO: 7.79 10*3/MM3 (ref 1.7–7)
NRBC BLD AUTO-RTO: 0 /100 WBC (ref 0–0.2)
PLATELET # BLD AUTO: 211 10*3/MM3 (ref 140–450)
PMV BLD AUTO: 9.3 FL (ref 6–12)
POTASSIUM SERPL-SCNC: 4 MMOL/L (ref 3.5–5.2)
PROT SERPL-MCNC: 6.8 G/DL (ref 6–8.5)
PROTHROMBIN TIME: 13.2 SECONDS (ref 11.9–14.1)
RBC # BLD AUTO: 4.66 10*6/MM3 (ref 4.14–5.8)
SODIUM SERPL-SCNC: 136 MMOL/L (ref 136–145)
WBC # BLD AUTO: 11.43 10*3/MM3 (ref 3.4–10.8)

## 2020-10-20 PROCEDURE — 93005 ELECTROCARDIOGRAM TRACING: CPT | Performed by: SPECIALIST

## 2020-10-20 PROCEDURE — 85025 COMPLETE CBC W/AUTO DIFF WBC: CPT | Performed by: INTERNAL MEDICINE

## 2020-10-20 PROCEDURE — 93010 ELECTROCARDIOGRAM REPORT: CPT | Performed by: INTERNAL MEDICINE

## 2020-10-20 PROCEDURE — 99232 SBSQ HOSP IP/OBS MODERATE 35: CPT | Performed by: SPECIALIST

## 2020-10-20 PROCEDURE — 94799 UNLISTED PULMONARY SVC/PX: CPT

## 2020-10-20 PROCEDURE — 85610 PROTHROMBIN TIME: CPT | Performed by: INTERNAL MEDICINE

## 2020-10-20 PROCEDURE — 99239 HOSP IP/OBS DSCHRG MGMT >30: CPT | Performed by: INTERNAL MEDICINE

## 2020-10-20 PROCEDURE — 80053 COMPREHEN METABOLIC PANEL: CPT | Performed by: INTERNAL MEDICINE

## 2020-10-20 PROCEDURE — 93005 ELECTROCARDIOGRAM TRACING: CPT | Performed by: INTERNAL MEDICINE

## 2020-10-20 RX ADMIN — BUPRENORPHINE HYDROCHLORIDE AND NALOXONE HYDROCHLORIDE DIHYDRATE 0.5 TABLET: 8; 2 TABLET SUBLINGUAL at 13:57

## 2020-10-20 RX ADMIN — BUPRENORPHINE HYDROCHLORIDE AND NALOXONE HYDROCHLORIDE DIHYDRATE 0.5 TABLET: 8; 2 TABLET SUBLINGUAL at 07:41

## 2020-10-20 NOTE — PROGRESS NOTES
LOS: 2 days     Name: Neo Chung  Age/Sex: 44 y.o. male  :  1975        PCP: Provider, No Known  REF: No ref. provider found    Active Problems:    Symptomatic bradycardia      Reason for follow-up: Symptomatic Bradycardia     Subjective       Subjective     Neo Chung is a 44-year-old male with a past medical history significant for chronic pain. Patient presented to the ED with complaints of syncope. He states he was driving down the road and became diaphoretic and he passed out for about 15 minutes. He states his wife said he was talking to her and then just passed out. He had no symptoms prior to the event happening. The wife was able to slow down the car safely. He also had another similar episode while walking into the hospital. He reports he was feeling well up until the last 2 days when he started getting swelling in his hands and soreness of his joints all over his body. She denies any chest pain, shortness of breath, dizziness or lightheadedness. His past medical history is unremarkable other than chronic pain. He is an overall active person.      Per chart review patient had a syncopal episode in the ER which was correlated with a long pause that appeared asystole for a brief time and then he went into atrial fibrillation but has since went into normal sinus rhythm. There was no seizure activity noted. Patient reported that he does use marijuana regularly and smoked yesterday morning. He only smoked 1 which is normal for him. He states he has been smoking for many years.  He also takes suboxone regularly and has not had a change in dose. He recently was trying to quit smoking and was wearing a nicotine patch yesterday while smoking when the event happened,.        Interval History: Patient remains sinus kiara 50-60 bpm. No reoccurrence of syncope. He denies shortness of breath, chest pain and dizziness. Echo showed normal LV function.       Vital Signs  Temp:  [97.7 °F (36.5 °C)-98.6  °F (37 °C)] 98.5 °F (36.9 °C)  Heart Rate:  [49-71] 53  Resp:  [15-20] 20  BP: ()/(58-89) 108/76     Vital Signs (last 72 hrs)       10/17 0700  -  10/18 0659 10/18 0700  -  10/19 0659 10/19 0700  -  10/20 0659 10/20 0700  -  10/20 1347   Most Recent    Temp (°F)   97.7 -  98.3    97.7 -  98.2    98.5 -  98.6     98.5 (36.9)    Heart Rate   47 -  97    48 -  78    53 -  66     53    Resp   14 -  18    15 -  20      20     20    BP   90/49 -  127/69    89/61 -  127/83    108/68 -  112/73     108/76    SpO2 (%)   92 -  100    90 -  99    93 -  96     95        Body mass index is 23.52 kg/m².    Intake/Output Summary (Last 24 hours) at 10/20/2020 1347  Last data filed at 10/20/2020 1055  Gross per 24 hour   Intake 600 ml   Output 950 ml   Net -350 ml     Objective    Objective       Physical Exam:     General Appearance:    Alert, cooperative, in no acute distress   Head:    Normocephalic, without obvious abnormality, atraumatic   Eyes:            Conjunctivae and sclerae normal, no   icterus, no pallor, corneas clear.   Neck:   No adenopathy, supple, trachea midline, no thyromegaly, no   carotid bruit, no JVD   Lungs:     Clear to auscultation,respirations regular, even and                  unlabored    Heart:    Regular rhythm and bradycardia, normal S1 and S2, no            murmur, no gallop, no rub, no click   Chest Wall:    No abnormalities observed   Abdomen:     Normal bowel sounds, no masses, no organomegaly, soft        non-tender, non-distended, no guarding, no rebound                tenderness   Extremities:   Moves all extremities well, no edema, no cyanosis, no             redness   Pulses:   Pulses palpable and equal bilaterally   Skin:   No bleeding, bruising or rash       Neurologic:   Alert and oriented    I have seen and performed a physical examination today.     Results review       Results Review:   Results from last 7 days   Lab Units 10/20/20  0337 10/19/20  0209 10/18/20  1411   WBC  10*3/mm3 11.43* 10.26 11.26*   HEMOGLOBIN g/dL 14.5 14.4 15.8   PLATELETS 10*3/mm3 211 209 231     Results from last 7 days   Lab Units 10/20/20  0337 10/19/20  0209 10/18/20  1411   SODIUM mmol/L 136 139 136   POTASSIUM mmol/L 4.0 4.2 3.9   CHLORIDE mmol/L 102 107 102   CO2 mmol/L 23.8 23.5 23.2   BUN mg/dL 10 7 8   CREATININE mg/dL 0.81 0.66* 0.83   CALCIUM mg/dL 9.0 8.8 9.4   GLUCOSE mg/dL 92 107* 94   ALT (SGPT) U/L 255* 185* 216*   AST (SGOT) U/L 169* 112* 141*     Results from last 7 days   Lab Units 10/19/20  0951 10/19/20  0209 10/18/20  2043 10/18/20  1630 10/18/20  1411   TROPONIN T ng/mL <0.010 <0.010 <0.010 <0.010 <0.010     No results found for: INR  Lab Results   Component Value Date    MG 2.0 10/19/2020    MG 2.0 10/18/2020     Lab Results   Component Value Date    TSH 3.100 10/19/2020      Imaging Results (Last 48 Hours)     Procedure Component Value Units Date/Time    CT Chest Pulmonary Embolism [[id number]] Collected: 10/18/20 1729     Updated: 10/18/20 1732    Narrative:      CT CHEST PULMONARY EMBOLISM W CONTRAST    INDICATION:   Syncope and elevated d-dimer.    TECHNIQUE:   CT angiogram of the chest during intravenous contrast administration. Dose recorded in the patient's chart. 3-D reconstructions were obtained and reviewed.   Radiation dose reduction techniques included automated exposure control or exposure modulation  based on body size. Count of known CT and cardiac nuc med studies performed in previous 12 months: 0.     COMPARISON:   None available.    FINDINGS:   There is no evidence for pulmonary embolism. There is a small hiatal hernia. There is no pericardial effusion or pleural effusion. There is no convincing evidence for thoracic aortic dissection. There are mild there is no axillary lymphadenopathy. There  are some prominent AP window lymph nodes which are nonspecific. There are some thickening of the central bronchovascular soft tissues at the lucia bilaterally but no discrete  hilar lymphadenopathy. There is no pneumothorax. There is a noncalcified nodule  in the right upper lobe about 5 mm in diameter. There is an adjacent 4 mm noncalcified nodule. They are both probably along the minor fissure which favor subpleural lymph nodes. There is dependent atelectasis. There is no acute infiltrate otherwise.  Vascular calcifications involving the coronary arteries.        Impression:        1. No evidence for pulmonary embolus.  2. Small noncalcified nodules right upper lobe largest 5 mm dimension. Follow-up recommended using Fleischner Society criteria. Management recommendation: Based on current published guidelines, uncomplicated pulmonary nodules less than 6 mm do not  require CT follow-up. In high risk patients, follow-up CT in 12 months is optional. (Fleischner Society guidelines, 2017). Management recommendation: Based on current published guidelines, uncomplicated pulmonary nodules less than 6 mm do not require CT  follow-up. In high risk patients, follow-up CT in 12 months is optional. (Fleischner Society guidelines, 2017).   3. Mild coronary artery vascular calcification. No convincing evidence for thoracic aortic dissection. No pleural or pericardial effusion. No pneumothorax.     Signer Name: Keren Zhou MD   Signed: 10/18/2020 5:29 PM   Workstation Name: [email or web address]    [facility]    CT Head Without Contrast [[id number]] Collected: 10/18/20 1712     Updated: 10/18/20 1714    Narrative:      HISTORY:   Seizure-like activity.    TECHNIQUE:   CT head without contrast. Radiation dose reduction techniques included automated exposure control or exposure modulation based on body size. Radiation audit for number of CT and nuclear cardiology exams performed in the last year: 0.      COMPARISON:   None    FINDINGS:   There is no displaced calvarial fracture. The mastoid air cells are clear. There is mild mucosal thickening in the ethmoid air cells but no air-fluid level  in the visualized paranasal sinuses. There is no evidence for acute intracranial hemorrhage or  extra-axial fluid collection. The ventricles are normal in size and configuration. The gray-white junction is well-maintained. No acute cortical infarct is suspected. No intracranial mass effect. The basilar cisterns are patent.      Impression:      Negative, normal noncontrast head CT. If this is new onset seizure disorder, follow-up best performed with MRI brain with and without contrast the patient is candidate.    Signer Name: Keren Zhou MD   Signed: 10/18/2020 5:12 PM   Workstation Name: [email or web address]    [facility]    XR Chest 1 View [[id number]] Collected: 10/18/20 1432     Updated: 10/18/20 1500    Narrative:      EXAMINATION: XR CHEST 1 VW-      CLINICAL INDICATION:     Syncope     TECHNIQUE:  XR CHEST 1 VW-      COMPARISON: NONE      FINDINGS:      Lungs are aerated.   Heart size, mediastinum, and pulmonary vascularity are unremarkable.   No pneumothorax.   No effusions.   No acute osseous findings.          Impression:      No radiographic evidence of acute cardiac or pulmonary  disease.     This report was finalized on 10/18/2020 2:32 PM by Dr. Feng Funk MD.           Echo   Results for orders placed during the hospital encounter of 10/18/20   Adult Transthoracic Echo Complete W/ Cont if Necessary Per Protocol    Narrative · Normal left ventricular cavity size and wall thickness noted. All left   ventricular wall segments contract normally.  · Left ventricular ejection fraction appears to be 61 - 65%.  · The aortic valve is not well visualized. No aortic valve regurgitation   or stenosis is present. The aortic valve is grossly normal in structure.  · There are myxomatous changes of the mitral valve apparatus present.   There is mild, anterior mitral leaflet thickening present. Mild mitral   valve regurgitation is present. No significant mitral valve stenosis is   present.  · There  is no evidence of pericardial effusion. .           I reviewed the patient's new clinical results.    Telemetry: Sinus kiara 50-60 bpm        Medication Review:   buprenorphine-naloxone, 0.5 tablet, Sublingual, BID  enoxaparin, 40 mg, Subcutaneous, Q24H  sodium chloride, 10 mL, Intravenous, Q12H             Assessment      Assessment:  1. Symptomatic bradycardia with syncope, sinus kiara 50-60 bpm   2. Paroxysmal atrial fibrillation, now back in NSR, CHADS-VASc score of 0  3. History of opiate use disorder, UDS positive for THC and buprenorphine      Plan     Recommendations:  1. Further episodes of severe bradycardia symptoms no further episodes of atrial fibrillation either  2. I had a discussion with the patient and so far no clear reason for the episodes of asystole recommended a pacemaker the patient says he wants to think about it before the final decision  3. He was advised to quit using drugs    I discussed the patients findings and my recommendations with patient and family      Nisha TAE Rollins, acting as scribe for Dr. John Constantino MD FACC  10/20/20  13:47 EDT  IJohn MD, FACC, performed the services described in this documentation as documented by the above-named individual under my supervision and made necessary changes in the note is both accurate and complete  Please note that portions of this note were completed with a voice recognition program.

## 2020-10-20 NOTE — PROGRESS NOTES
[facility] HOSPITALIST PROGRESS NOTE     Patient Identification:  Name:  Neo Chung  Age:  44 y.o.  Sex:  male  :  1975  MRN:  [id number]  Visit Number:  [id number]  ROOM: [address]     Primary Care Provider:  Provider, No Known    Length of stay in inpatient status:  2    Subjective     Chief Compliant:    Chief Complaint   Patient presents with   • Syncope       History of Presenting Illness:    Patient continues to report feeling well. No recurrent episodes of syncope. HR has got down to lower 50's. Denies any new complaints.     He reports he was considering pacemaker. I reinforced why cardiology was recommending it and strongly encouraged him to consider getting the potentially life saving device.     ROS:  Otherwise 10 point ROS negative other than documented above in HPI.     Objective     Current Hospital Meds:buprenorphine-naloxone, 0.5 tablet, Sublingual, BID  enoxaparin, 40 mg, Subcutaneous, Q24H  sodium chloride, 10 mL, Intravenous, Q12H         Current Antimicrobial Therapy:  Anti-Infectives (From admission, onward)    None        Current Diuretic Therapy:  Diuretics (From admission, onward)    None        ----------------------------------------------------------------------------------------------------------------------  Vital Signs:  Temp:  [97.8 °F (36.6 °C)-98.6 °F (37 °C)] 97.9 °F (36.6 °C)  Heart Rate:  [49-72] 64  Resp:  [14-20] 14  BP: ()/(58-89) 140/89  SpO2:  [90 %-98 %] 96 %  on   ;   Device (Oxygen Therapy): room air  Body mass index is 23.52 kg/m².    Wt Readings from Last 3 Encounters:   10/20/20 72.3 kg (159 lb 4.8 oz)     Intake & Output (last 3 days)       10/17 07 - 10/18 0700 10/18 0701 - 10/19 0700 10/19 0701 - 10/20 0700 10/20 0701 - 10/21 0700    P.O.   600 355    I.V. (mL/kg)  2166.6 (30.1) 705 (9.8)     IV Piggyback  2000      Total Intake(mL/kg)  4166.6 (57.9) 1305 (18) 355 (4.9)    Urine (mL/kg/hr)  1180 1000 (0.6) 500 (0.6)    Total Output   1180 1000 500    Net  +2986.6 +305 -145            Urine Unmeasured Occurrence   3 x         Diet Regular; Cardiac  NPO Diet  ----------------------------------------------------------------------------------------------------------------------  Physical exam:  Constitutional:  Well-developed and well-nourished.  No respiratory distress.      HENT:  Head:  Normocephalic and atraumatic.  Mouth:  Moist mucous membranes.    Eyes:  Conjunctivae and EOM are normal. No scleral icterus.    Neck:  Neck supple.  No JVD present.    Cardiovascular:  Normal rate, regular rhythm and normal heart sounds with no murmur.  Pulmonary/Chest:  No respiratory distress, no wheezes, no crackles, with normal breath sounds and good air movement.  Abdominal:  Soft.  Bowel sounds are normal.  No distension and no tenderness.   Musculoskeletal:  No edema, no tenderness, and no deformity.  No red or swollen joints anywhere.    Neurological:  Alert and oriented to person, place, and time.  No cranial nerve deficit.  No tongue deviation.  No facial droop.  No slurred speech.   Skin:  Skin is warm and dry. No rash noted. No pallor.   Peripheral vascular:  Pulses in all 4 extremities with no clubbing, no cyanosis, no edema.  ----------------------------------------------------------------------------------------------------------------------  Tele:    ----------------------------------------------------------------------------------------------------------------------  Results from last 7 days   Lab Units 10/20/20  0337 10/19/20  0209 10/18/20  1411   WBC 10*3/mm3 11.43* 10.26 11.26*   HEMOGLOBIN g/dL 14.5 14.4 15.8   HEMATOCRIT % 43.6 42.9 47.5   MCV fL 93.6 93.5 92.8   MCHC g/dL 33.3 33.6 33.3   PLATELETS 10*3/mm3 211 209 231         Results from last 7 days   Lab Units 10/20/20  0337 10/19/20  0209 10/18/20  1411   SODIUM mmol/L 136 139 136   POTASSIUM mmol/L 4.0 4.2 3.9   MAGNESIUM mg/dL  --  2.0 2.0   CHLORIDE mmol/L 102 107 102   CO2 mmol/L  23.8 23.5 23.2   BUN mg/dL 10 7 8   CREATININE mg/dL 0.81 0.66* 0.83   EGFR IF NONAFRICN AM mL/min/1.73 104 132 101   CALCIUM mg/dL 9.0 8.8 9.4   GLUCOSE mg/dL 92 107* 94   ALBUMIN g/dL 3.75 3.59 4.24   BILIRUBIN mg/dL 0.6 0.4 0.4   ALK PHOS U/L 103 100 120*   AST (SGOT) U/L 169* 112* 141*   ALT (SGPT) U/L 255* 185* 216*   Estimated Creatinine Clearance: 120.3 mL/min (by C-G formula based on SCr of 0.81 mg/dL).  No results found for: AMMONIA  Results from last 7 days   Lab Units 10/19/20  0951 10/19/20  0209 10/18/20  2043   TROPONIN T ng/mL <0.010 <0.010 <0.010             No results found for: HGBA1C, POCGLU  Lab Results   Component Value Date    TSH 3.100 10/19/2020    FREET4 0.94 10/19/2020     No results found for: PREGTESTUR, PREGSERUM, HCG, HCGQUANT  Pain Management Panel     Pain Management Panel Latest Ref Rng & Units 10/18/2020    AMPHETAMINES SCREEN, URINE Negative Negative    BARBITURATES SCREEN Negative Negative    BENZODIAZEPINE SCREEN, URINE Negative Negative    BUPRENORPHINEUR Negative Positive(A)    COCAINE SCREEN, URINE Negative Negative    METHADONE SCREEN, URINE Negative Negative        Brief Urine Lab Results  (Last result in the past 365 days)      Color   Clarity   Blood   Leuk Est   Nitrite   Protein   CREAT   Urine HCG        10/18/20 1519 Dark Yellow Clear Negative Small (1+) Negative Negative             No results found for: BLOODCX  No results found for: URINECX  No results found for: WOUNDCX  No results found for: STOOLCX  No results found for: RESPCX  No results found for: AFBCX        I have personally looked at the labs and they are summarized above.  ----------------------------------------------------------------------------------------------------------------------  Detailed radiology reports for the last 24 hours:    Imaging Results (Last 24 Hours)     ** No results found for the last 24 hours. **        Assessment & Plan    #Symptomatic bradycardia   - Two episodes of syncope  with latter associated with sinus pause   - No history of tick bites, AV herman blockade agents. TSH borderline elevated initially, repeat normal. Unclear cause or exacerbating factors.   - Echo revealed normal LVEF and no major valvular abnormalities.   - Cardiology consulted and recommend PPM placement. Patient considering options.   - Continue close monitoring on tele     #Mild hepatocellular transaminitis   #Hx of Hepatitis B?  - LFTs slightly worse today.   - Hepatitis B C IGM positive   - Surface antigen confirmation pending   - Suspect from chronic hepatitis B. No known tick exposure or leukopenia. No potential offending medications.   - Will get INR  - Outpatient hepatology f/u     #5 mm pulmonary nodule   - Given patient is high risk for lung cancer, would recommend repeat CT in 1 year based on guidelines     #Hx of opiate use disorder   #THC use   - Will continue home suboxone     F: PO  E: Replace as needed   N: Cardiac     Code status: Full     Dispo: Pending pacemaker placement.     VTE Prophylaxis:   Mechanical Order History:     None      Pharmalogical Order History:      Ordered     Dose Route Frequency Stop    10/18/20 2009  enoxaparin (LOVENOX) syringe 40 mg      40 mg SC Every 24 Hours --                Alfredo Rollins MD  [facility] Hospitalist  10/20/20  17:41 EDT

## 2020-10-20 NOTE — PLAN OF CARE
Problem: Arrhythmia/Dysrhythmia  Goal: Normalized Cardiac Rhythm  Outcome: Ongoing, Progressing  Intervention: Monitor and Manage Cardiac Rhythm Effects  Description: Monitor electrocardiogram closely for rate and rhythm changes; evaluate response to treatment.  Recognize elevated risk for venous thromboembolism; implement prophylaxis.  Anticipate administration of pharmacologic therapy to prevent or manage dysrhythmia (e.g., antidysrhythmic, electrolyte replacement or binding agent).  If hypotensive, lower head of bed to enhance cerebral blood flow.  Provide oxygen therapy judiciously to promote tissue oxygenation.  Promote comfort to prevent dysrhythmia triggered by increased sympathetic tone; consider pharmacologic and nonpharmacologic strategies (e.g., calm environment, rest, stress reduction).  Anticipate need for additional therapy, such as cardioversion or cardiac rhythm management device insertion to improve perfusion and hemodynamic stability.  Initiate emergency protocol if life-threatening rhythm disturbance occurs (e.g., Rapid Response Team, Advanced Cardiac Life Support).  Acknowledge fear and anxiety; encourage questions; provide support and information.  Recent Flowsheet Documentation  Taken 10/20/2020 0200 by Emily Arias RN  VTE Prevention/Management:   sequential compression devices off   bleeding risk factor(s) identified  Taken 10/19/2020 2030 by Emily Arias, MARS  VTE Prevention/Management: bleeding risk factor(s) identified     Problem: Fall Injury Risk  Goal: Absence of Fall and Fall-Related Injury  Outcome: Ongoing, Progressing  Intervention: Promote Injury-Free Environment  Description: Provide a safe, barrier-free environment that encourages independent activity.  Keep care area uncluttered and well-lighted.  Determine need for increased observation or auditory alerts (e.g., bed or chair alarm).  Assess equipment and environmental modification needs (e.g., low bed, signage,  nonskid footwear, grab bars).  Avoid use of restraints.  Recent Flowsheet Documentation  Taken 10/20/2020 0300 by Emily Arias RN  Safety Promotion/Fall Prevention:   activity supervised   assistive device/personal items within reach  Taken 10/19/2020 2300 by Emily Arias RN  Safety Promotion/Fall Prevention: activity supervised  Taken 10/19/2020 2100 by Emily Arias RN  Safety Promotion/Fall Prevention: activity supervised  Taken 10/19/2020 2030 by Couch,  Alyssa, RN  Safety Promotion/Fall Prevention: activity supervised     Problem: Adult Inpatient Plan of Care  Goal: Plan of Care Review  Outcome: Ongoing, Progressing  Flowsheets (Taken 10/19/2020 1527 by Maykel Flores RN)  Progress: improving  Plan of Care Reviewed With: patient  Outcome Summary: VSS. Asymptomatic SB on monitor. RA. Echo done today. Suboxone given. No complaints. Will continue to monitor.  Goal: Patient-Specific Goal (Individualized)  Outcome: Ongoing, Progressing  Goal: Absence of Hospital-Acquired Illness or Injury  Outcome: Ongoing, Progressing  Intervention: Identify and Manage Fall Risk  Description: Perform standard risk assessment with a validated tool or comprehensive approach appropriate to the patient on admission; reassess fall risk frequently, with change in status or transfer to another level of care.  Communicate fall injury risk to interprofessional healthcare team.  Determine need for increased observation, equipment and environmental modification, such as low bed and signage, as well as supportive, nonskid footwear.  Adjust safety measures to individual developmental age, stage and identified risk factors.  Reinforce the importance of safety and physical activity with patient and family.  Perform regular intentional rounding to assess need for position change, pain assessment, personal needs, including assistance with toileting.  Flowsheets  Taken 10/20/2020 0300  Safety Promotion/Fall  Prevention:   activity supervised   assistive device/personal items within reach  Taken 10/19/2020 2300  Safety Promotion/Fall Prevention: activity supervised  Taken 10/19/2020 2100  Safety Promotion/Fall Prevention: activity supervised  Taken 10/19/2020 2030  Safety Promotion/Fall Prevention: activity supervised  Intervention: Prevent Skin Injury  Description: Assess skin risk on admission and at regular intervals throughout hospital stay.  Keep all areas of skin (especially folds) clean and dry.  Maintain adequate skin hydration.  Relieve and redistribute pressure and protect bony prominences; implement measures based on patient-specific risk factors.  Match turning and repositioning schedule to clinical condition.  Encourage weight shift frequently; assist with reposition if unable to complete independently.  Float heels off bed. Avoid pressure on the Achilles tendon.  Keep skin free from extended contact with medical devices.  Use aids (e.g., slide boards, mechanical lift) during transfer.  Flowsheets  Taken 10/20/2020 0200 by Anjana Miranda  Body Position: position changed independently  Taken 10/19/2020 2030 by Emily Arias RN  Body Position: position changed independently  Intervention: Prevent and Manage VTE (venous thromboembolism) Risk  Description: Assess for VTE risk.  Encourage/assist with early ambulation.  Initiate and maintain compression or other therapy, as indicated based on identified risk in accordance with organizational protocol/provider order.  Encourage both active and passive leg exercises while in bed, if unable to ambulate.  Flowsheets  Taken 10/20/2020 0200  VTE Prevention/Management:   sequential compression devices off   bleeding risk factor(s) identified  Taken 10/19/2020 2030  VTE Prevention/Management: bleeding risk factor(s) identified  Goal: Optimal Comfort and Wellbeing  Outcome: Ongoing, Progressing  Intervention: Provide Person-Centered Care  Description: Use a  family-focused approach to care.  Develop trust and rapport by proactively providing information, encouraging questions, addressing concerns and offering reassurance.  Acknowledge emotional response to hospitalization.  Recognize and utilize personal coping strategies.  Honor spiritual and cultural preferences.  Flowsheets  Taken 10/20/2020 0200  Trust Relationship/Rapport:   care explained   choices provided  Taken 10/19/2020 2030  Trust Relationship/Rapport: care explained  Goal: Readiness for Transition of Care  Outcome: Ongoing, Progressing  Intervention: Mutually Develop Transition Plan  Description: Identify available resources for support (e.g., family, friends, community).  Identify and address barriers to ongoing treatment and home management (e.g., environmental, financial).  Provide opportunities to practice self-management skills.  Assess and monitor emotional readiness for transition.  Establish/reconnect linkage with outpatient providers or community-based services.  Flowsheets (Taken 10/18/2020 1820 by Apple Acevedo, RN)  Readmission Within the Last 30 Days: no previous admission in last 30 days   Goal Outcome Evaluation:  Plan of Care Reviewed With: patient  Progress: improving

## 2020-10-20 NOTE — PLAN OF CARE
Goal Outcome Evaluation:  Plan of Care Reviewed With: patient  Progress: improving  Outcome Summary: VSS. SR/SB on monitor. RA. Suboxone given as ordered. NPO at midnight for possible pacemaker placement. Pt. says he will think about it. No other complaints. Will continue to monitor.

## 2020-10-21 LAB
HBV SURFACE AG SERPL QL IA: NORMAL
HBV SURFACE AG SERPL QL NT: POSITIVE

## 2020-10-21 NOTE — PAYOR COMM NOTE
"[facility]  NPI: [id number]    Utilization Review   Contact:Sasha Negron MSN, APRN, FNP- BC  Phone: [phone]  Fax: [phone]    [employer] mcaid/ attn: nurse review  Discharge Notification  REF# [id number]  D/c on 10-20-20 left ama    Neo Chung (44 y.o. Male)     Date of Birth Social Security Number Address Home Phone MRN    1975  [address]  [facility] [id number] [phone] [id number]    Congregational Marital Status          None Single       Admission Date Admission Type Admitting Provider Attending Provider Department, Room/Bed    10/18/20 Emergency Augustine Uriostegui MD  [facility] PROGRESS CARE, P207/1P    Discharge Date Discharge Disposition Discharge Destination        10/20/2020 Left Against Medical Advice              Attending Provider: (none)   Allergies: No Known Allergies    Isolation: None   Infection: None   Code Status: Prior    Ht: 175.3 cm (69\")   Wt: 72.3 kg (159 lb 4.8 oz)    Admission Cmt: None   Principal Problem: None                Active Insurance as of 10/18/2020     Primary Coverage     Payor Plan Insurance Group Employer/Plan Group    [place] [id number]     Payor Plan Address Payor Plan Phone Number Payor Plan Fax Number Effective Dates    [employer] Claims Office - [address] [address] [phone]  4/1/2020 - None Entered    [facility] [id number]       Subscriber Name Subscriber Birth Date Member ID       NEO CHUNG 1975 [id number]                 Emergency Contacts      (Rel.) Home Phone Work Phone Mobile Phone    BARNEY HICKEY (Significant Other) [phone] -- --        Edith Yanez, RN   Registered Nurse      Nursing Note   Signed   Date of Service:  10/20/20 2130   Creation Time:  10/20/20 2235            Signed            Show:Clear all  [x]Manual[]Template[]Copied    Added by:  [x]Edith Yanez, RN    []Hover for details  Patient states he wants to leave AMA d/t his wife not being " able to sleep with him. Educated as to the importance of staying for pacemaker placement. States he will go to [place]. Dr James informed. Patient signed AMA form and left hospital with wife after receiving his bottle of suboxone from pharmacy

## 2020-10-21 NOTE — NURSING NOTE
Patient states he wants to leave AMA d/t his wife not being able to sleep with him. Educated as to the importance of staying for pacemaker placement. States he will go to [place]. Dr James informed. Patient signed AMA form and left hospital with wife after receiving his bottle of suboxone from pharmacy

## 2020-10-21 NOTE — DISCHARGE SUMMARY
"    [facility] HOSPITALISTS DISCHARGE SUMMARY    Patient Identification:  Name:  Neo Chung  Age:  44 y.o.  Sex:  male  :  1975  MRN:  [id number]  Visit Number:  [id number]    Date of Admission: 10/18/2020  Date of Discharge:  10/21/2020    PCP: Provider, No Known    DISCHARGE DIAGNOSIS  #Symptomatic bradycardia   #Mild hepatocellular transaminitis   #Hx of Hepatitis B?  #5 mm pulmonary nodule   #Hx of opiate use disorder   #THC use       CONSULTS   Cardiology     PROCEDURES PERFORMED  Echocardiogram: 10/19  · Normal left ventricular cavity size and wall thickness noted. All left ventricular wall segments contract normally.  · Left ventricular ejection fraction appears to be 61 - 65%.  · The aortic valve is not well visualized. No aortic valve regurgitation or stenosis is present. The aortic valve is grossly normal in structure.  · There are myxomatous changes of the mitral valve apparatus present. There is mild, anterior mitral leaflet thickening present. Mild mitral valve regurgitation is present. No significant mitral valve stenosis is present.  · There is no evidence of pericardial effusion. .         HOSPITAL COURSE  Patient is a 44 y.o. male presented on 10/18 to [facility] complaining of \"Passing out\" .  Please see the admitting history and physical for further details.      Mr. Chung is our 45 yo M with hx of THC and suboxone use who presented after passing out while driving. Patient suspected he was out for 15 minutes. He was not confused after. He was diaphoretic prio to episode. Patient had a similar episode in the ED when he was found to have a pause and appeared to be in asystole at that time. Again no confusion after, no seizure movements, and declined chest pain. Patient reported marijuana and suboxone use but had not changed doses recently. No history of tick bites, AV herman blockade agents. TSH borderline elevated initially, repeat normal. Unclear cause or " exacerbating factors. Isolated EKG suggestive of possible Afib. [id number]: 0. Might represent sick sinus syndrome. Echo revealed normal LVEF and no major valvular abnormalities. Cardiology consulted and recommend PPM placement. I reiterated this and strongly recommended PPM placement. Patient reported he was considering options. Patient also had mild hepatocellular transaminitis likely from suspected chronic hepatitis B. INR normal.  Was going to recommend hepatology f/u. Patient also had 5 mm pulmonary nodule with recommended 12 month f/u imaging. Unfortunately, patient left AMA overnight on 10/20 and reported he was going to f/u in [place].     VITAL SIGNS:  Temp:  [97.9 °F (36.6 °C)] 97.9 °F (36.6 °C)  Heart Rate:  [51-64] 51  Resp:  [14-17] 16  BP: (102-140)/(68-89) 102/68  SpO2:  [93 %-99 %] 93 %  on   ;   Device (Oxygen Therapy): room air    Body mass index is 23.52 kg/m².  Wt Readings from Last 3 Encounters:   10/20/20 72.3 kg (159 lb 4.8 oz)       PHYSICAL EXAM:  Constitutional:  Well-developed and well-nourished.  No respiratory distress.      HENT:  Head:  Normocephalic and atraumatic.  Mouth:  Moist mucous membranes.    Eyes:  Conjunctivae and EOM are normal.  Pupils are equal, round, and reactive to light.  No scleral icterus.    Cardiovascular:  Normal rate, regular rhythm and normal heart sounds with no murmur.  Pulmonary/Chest:  No respiratory distress, no wheezes, no crackles, with normal breath sounds and good air movement.  Abdominal:  Soft.  Bowel sounds are normal.  No distension and no tenderness.   Musculoskeletal:  No edema, no tenderness, and no deformity.  No red or swollen joints anywhere.    Neurological:  Alert and oriented to person, place, and time.  No gross neurological deficit.   Skin:  Skin is warm and dry. No rash noted. No pallor.   Peripheral vascular:  Strong pulses in all 4 extremities with no clubbing, no cyanosis, no edema.    DISCHARGE DISPOSITION   Stable    DISCHARGE  MEDICATIONS:     Discharge Medications      ASK your doctor about these medications      Instructions Start Date   buprenorphine-naloxone 8-2 MG per SL tablet  Commonly known as: SUBOXONE   1 tablet, Sublingual, Daily               Follow-up Information     Provider, No Known .    Contact information:  [facility] [address]  [phone]                    TEST  RESULTS PENDING AT DISCHARGE       CODE STATUS  Code Status and Medical Interventions:   Ordered at: 10/18/20 1814     Code Status:    CPR     Medical Interventions (Level of Support Prior to Arrest):    Full       Alfredo Rollins MD  [facility] Hospitalist  10/21/20  14:40 EDT    Please note that this discharge summary required more than 30 minutes to complete.